# Patient Record
Sex: FEMALE | Race: WHITE | Employment: OTHER | URBAN - METROPOLITAN AREA
[De-identification: names, ages, dates, MRNs, and addresses within clinical notes are randomized per-mention and may not be internally consistent; named-entity substitution may affect disease eponyms.]

---

## 2017-01-18 DIAGNOSIS — Z78.9 STATIN INTOLERANCE: ICD-10-CM

## 2017-01-18 DIAGNOSIS — E78.00 PURE HYPERCHOLESTEROLEMIA: ICD-10-CM

## 2017-01-18 DIAGNOSIS — E55.9 VITAMIN D DEFICIENCY: ICD-10-CM

## 2017-01-18 DIAGNOSIS — E03.8 OTHER SPECIFIED HYPOTHYROIDISM: ICD-10-CM

## 2017-01-18 LAB
A/G RATIO: 1.8 (ref 1.1–2.2)
ALBUMIN SERPL-MCNC: 4.4 G/DL (ref 3.4–5)
ALP BLD-CCNC: 115 U/L (ref 40–129)
ALT SERPL-CCNC: 67 U/L (ref 10–40)
ANION GAP SERPL CALCULATED.3IONS-SCNC: 12 MMOL/L (ref 3–16)
AST SERPL-CCNC: 50 U/L (ref 15–37)
BILIRUB SERPL-MCNC: 0.5 MG/DL (ref 0–1)
BUN BLDV-MCNC: 14 MG/DL (ref 7–20)
CALCIUM SERPL-MCNC: 9.6 MG/DL (ref 8.3–10.6)
CHLORIDE BLD-SCNC: 100 MMOL/L (ref 99–110)
CHOLESTEROL, TOTAL: 276 MG/DL (ref 0–199)
CO2: 29 MMOL/L (ref 21–32)
CREAT SERPL-MCNC: 0.5 MG/DL (ref 0.6–1.2)
GFR AFRICAN AMERICAN: >60
GFR NON-AFRICAN AMERICAN: >60
GLOBULIN: 2.5 G/DL
GLUCOSE BLD-MCNC: 101 MG/DL (ref 70–99)
HDLC SERPL-MCNC: 101 MG/DL (ref 40–60)
LDL CHOLESTEROL CALCULATED: 154 MG/DL
POTASSIUM SERPL-SCNC: 4.4 MMOL/L (ref 3.5–5.1)
SODIUM BLD-SCNC: 141 MMOL/L (ref 136–145)
TOTAL PROTEIN: 6.9 G/DL (ref 6.4–8.2)
TRIGL SERPL-MCNC: 107 MG/DL (ref 0–150)
TSH SERPL DL<=0.05 MIU/L-ACNC: 4.3 UIU/ML (ref 0.27–4.2)
VITAMIN D 25-HYDROXY: 55.4 NG/ML
VLDLC SERPL CALC-MCNC: 21 MG/DL

## 2017-01-24 ENCOUNTER — OFFICE VISIT (OUTPATIENT)
Dept: ENDOCRINOLOGY | Age: 72
End: 2017-01-24

## 2017-01-24 VITALS
WEIGHT: 147.8 LBS | HEIGHT: 62 IN | RESPIRATION RATE: 14 BRPM | SYSTOLIC BLOOD PRESSURE: 110 MMHG | HEART RATE: 78 BPM | OXYGEN SATURATION: 96 % | BODY MASS INDEX: 27.2 KG/M2 | DIASTOLIC BLOOD PRESSURE: 68 MMHG

## 2017-01-24 DIAGNOSIS — E55.9 VITAMIN D DEFICIENCY: Primary | ICD-10-CM

## 2017-01-24 DIAGNOSIS — E78.00 PURE HYPERCHOLESTEROLEMIA: ICD-10-CM

## 2017-01-24 DIAGNOSIS — E03.9 ACQUIRED HYPOTHYROIDISM: ICD-10-CM

## 2017-01-24 DIAGNOSIS — Z78.9 STATIN INTOLERANCE: ICD-10-CM

## 2017-01-24 PROCEDURE — 99214 OFFICE O/P EST MOD 30 MIN: CPT | Performed by: INTERNAL MEDICINE

## 2017-01-24 RX ORDER — LEVOTHYROXINE SODIUM 0.05 MG/1
50 TABLET ORAL DAILY
Qty: 30 TABLET | Refills: 3 | Status: SHIPPED | OUTPATIENT
Start: 2017-01-24 | End: 2017-05-17 | Stop reason: SDUPTHER

## 2017-03-29 RX ORDER — ERGOCALCIFEROL 1.25 MG/1
CAPSULE ORAL
Qty: 8 CAPSULE | Refills: 4 | Status: SHIPPED | OUTPATIENT
Start: 2017-03-29 | End: 2017-05-30 | Stop reason: SDUPTHER

## 2017-04-17 RX ORDER — AMLODIPINE BESYLATE 5 MG/1
TABLET ORAL
Qty: 90 TABLET | Refills: 0 | Status: SHIPPED | OUTPATIENT
Start: 2017-04-17 | End: 2017-05-17 | Stop reason: SDUPTHER

## 2017-05-18 RX ORDER — LEVOTHYROXINE SODIUM 0.05 MG/1
TABLET ORAL
Qty: 30 TABLET | Refills: 3 | Status: SHIPPED | OUTPATIENT
Start: 2017-05-18 | End: 2017-09-18 | Stop reason: SDUPTHER

## 2017-05-25 DIAGNOSIS — E78.00 PURE HYPERCHOLESTEROLEMIA: ICD-10-CM

## 2017-05-25 DIAGNOSIS — E03.9 ACQUIRED HYPOTHYROIDISM: ICD-10-CM

## 2017-05-25 LAB
A/G RATIO: 1.5 (ref 1.1–2.2)
ALBUMIN SERPL-MCNC: 4.2 G/DL (ref 3.4–5)
ALP BLD-CCNC: 84 U/L (ref 40–129)
ALT SERPL-CCNC: 17 U/L (ref 10–40)
ANION GAP SERPL CALCULATED.3IONS-SCNC: 12 MMOL/L (ref 3–16)
AST SERPL-CCNC: 21 U/L (ref 15–37)
BILIRUB SERPL-MCNC: 0.5 MG/DL (ref 0–1)
BUN BLDV-MCNC: 11 MG/DL (ref 7–20)
CALCIUM SERPL-MCNC: 9.4 MG/DL (ref 8.3–10.6)
CHLORIDE BLD-SCNC: 101 MMOL/L (ref 99–110)
CHOLESTEROL, TOTAL: 210 MG/DL (ref 0–199)
CO2: 29 MMOL/L (ref 21–32)
CREAT SERPL-MCNC: <0.5 MG/DL (ref 0.6–1.2)
GFR AFRICAN AMERICAN: >60
GFR NON-AFRICAN AMERICAN: >60
GLOBULIN: 2.8 G/DL
GLUCOSE BLD-MCNC: 104 MG/DL (ref 70–99)
HDLC SERPL-MCNC: 108 MG/DL (ref 40–60)
LDL CHOLESTEROL CALCULATED: 90 MG/DL
POTASSIUM SERPL-SCNC: 4.2 MMOL/L (ref 3.5–5.1)
SODIUM BLD-SCNC: 142 MMOL/L (ref 136–145)
TOTAL PROTEIN: 7 G/DL (ref 6.4–8.2)
TRIGL SERPL-MCNC: 61 MG/DL (ref 0–150)
TSH SERPL DL<=0.05 MIU/L-ACNC: 4.44 UIU/ML (ref 0.27–4.2)
VLDLC SERPL CALC-MCNC: 12 MG/DL

## 2017-05-30 ENCOUNTER — OFFICE VISIT (OUTPATIENT)
Dept: ENDOCRINOLOGY | Age: 72
End: 2017-05-30

## 2017-05-30 VITALS
SYSTOLIC BLOOD PRESSURE: 137 MMHG | RESPIRATION RATE: 14 BRPM | DIASTOLIC BLOOD PRESSURE: 75 MMHG | OXYGEN SATURATION: 97 % | HEART RATE: 76 BPM | HEIGHT: 62 IN | BODY MASS INDEX: 27.75 KG/M2 | WEIGHT: 150.8 LBS

## 2017-05-30 DIAGNOSIS — E03.9 ACQUIRED HYPOTHYROIDISM: ICD-10-CM

## 2017-05-30 DIAGNOSIS — E78.00 PURE HYPERCHOLESTEROLEMIA: ICD-10-CM

## 2017-05-30 DIAGNOSIS — E55.9 VITAMIN D DEFICIENCY: Primary | ICD-10-CM

## 2017-05-30 PROCEDURE — 99213 OFFICE O/P EST LOW 20 MIN: CPT | Performed by: INTERNAL MEDICINE

## 2017-05-30 RX ORDER — ERGOCALCIFEROL 1.25 MG/1
CAPSULE ORAL
Qty: 24 CAPSULE | Refills: 4 | Status: SHIPPED | OUTPATIENT
Start: 2017-05-30 | End: 2018-05-31 | Stop reason: SDUPTHER

## 2017-06-15 RX ORDER — OMEPRAZOLE 20 MG/1
CAPSULE, DELAYED RELEASE ORAL
Qty: 30 CAPSULE | Refills: 4 | Status: SHIPPED | OUTPATIENT
Start: 2017-06-15 | End: 2017-11-15 | Stop reason: SDUPTHER

## 2017-08-18 RX ORDER — AMLODIPINE BESYLATE 5 MG/1
TABLET ORAL
Qty: 90 TABLET | Refills: 1 | Status: SHIPPED | OUTPATIENT
Start: 2017-08-18 | End: 2018-05-08 | Stop reason: SDUPTHER

## 2017-08-22 ENCOUNTER — TELEPHONE (OUTPATIENT)
Dept: INTERNAL MEDICINE CLINIC | Age: 72
End: 2017-08-22

## 2017-08-22 ENCOUNTER — OFFICE VISIT (OUTPATIENT)
Dept: INTERNAL MEDICINE CLINIC | Age: 72
End: 2017-08-22

## 2017-08-22 VITALS
HEIGHT: 62 IN | HEART RATE: 73 BPM | DIASTOLIC BLOOD PRESSURE: 84 MMHG | SYSTOLIC BLOOD PRESSURE: 150 MMHG | OXYGEN SATURATION: 97 % | WEIGHT: 147.8 LBS | BODY MASS INDEX: 27.2 KG/M2

## 2017-08-22 DIAGNOSIS — K21.9 GASTROESOPHAGEAL REFLUX DISEASE WITHOUT ESOPHAGITIS: ICD-10-CM

## 2017-08-22 DIAGNOSIS — I10 ESSENTIAL HYPERTENSION: Primary | ICD-10-CM

## 2017-08-22 DIAGNOSIS — E78.00 PURE HYPERCHOLESTEROLEMIA: ICD-10-CM

## 2017-08-22 LAB
BASOPHILS ABSOLUTE: 0 K/UL (ref 0–0.2)
BASOPHILS RELATIVE PERCENT: 0.7 %
EOSINOPHILS ABSOLUTE: 0.1 K/UL (ref 0–0.6)
EOSINOPHILS RELATIVE PERCENT: 1.5 %
HCT VFR BLD CALC: 41.8 % (ref 36–48)
HEMOGLOBIN: 13.8 G/DL (ref 12–16)
LYMPHOCYTES ABSOLUTE: 1.5 K/UL (ref 1–5.1)
LYMPHOCYTES RELATIVE PERCENT: 24.1 %
MCH RBC QN AUTO: 31.2 PG (ref 26–34)
MCHC RBC AUTO-ENTMCNC: 33.1 G/DL (ref 31–36)
MCV RBC AUTO: 94.1 FL (ref 80–100)
MONOCYTES ABSOLUTE: 0.6 K/UL (ref 0–1.3)
MONOCYTES RELATIVE PERCENT: 9.2 %
NEUTROPHILS ABSOLUTE: 4 K/UL (ref 1.7–7.7)
NEUTROPHILS RELATIVE PERCENT: 64.5 %
PDW BLD-RTO: 13 % (ref 12.4–15.4)
PLATELET # BLD: 290 K/UL (ref 135–450)
PMV BLD AUTO: 8 FL (ref 5–10.5)
RBC # BLD: 4.44 M/UL (ref 4–5.2)
WBC # BLD: 6.2 K/UL (ref 4–11)

## 2017-08-22 PROCEDURE — 99213 OFFICE O/P EST LOW 20 MIN: CPT | Performed by: INTERNAL MEDICINE

## 2017-08-22 ASSESSMENT — ENCOUNTER SYMPTOMS
RESPIRATORY NEGATIVE: 1
EYES NEGATIVE: 1

## 2017-09-18 RX ORDER — LEVOTHYROXINE SODIUM 0.05 MG/1
TABLET ORAL
Qty: 30 TABLET | Refills: 2 | Status: SHIPPED | OUTPATIENT
Start: 2017-09-18 | End: 2017-12-21 | Stop reason: SDUPTHER

## 2017-10-17 ENCOUNTER — NURSE ONLY (OUTPATIENT)
Dept: INTERNAL MEDICINE CLINIC | Age: 72
End: 2017-10-17

## 2017-10-17 DIAGNOSIS — Z23 NEED FOR INFLUENZA VACCINATION: Primary | ICD-10-CM

## 2017-10-17 PROCEDURE — G0008 ADMIN INFLUENZA VIRUS VAC: HCPCS | Performed by: INTERNAL MEDICINE

## 2017-10-17 PROCEDURE — 90662 IIV NO PRSV INCREASED AG IM: CPT | Performed by: INTERNAL MEDICINE

## 2017-11-27 DIAGNOSIS — E55.9 VITAMIN D DEFICIENCY: ICD-10-CM

## 2017-11-27 DIAGNOSIS — E78.00 PURE HYPERCHOLESTEROLEMIA: ICD-10-CM

## 2017-11-27 DIAGNOSIS — E03.9 ACQUIRED HYPOTHYROIDISM: ICD-10-CM

## 2017-11-27 LAB
A/G RATIO: 1.7 (ref 1.1–2.2)
ALBUMIN SERPL-MCNC: 4.4 G/DL (ref 3.4–5)
ALP BLD-CCNC: 80 U/L (ref 40–129)
ALT SERPL-CCNC: 16 U/L (ref 10–40)
ANION GAP SERPL CALCULATED.3IONS-SCNC: 17 MMOL/L (ref 3–16)
AST SERPL-CCNC: 20 U/L (ref 15–37)
BILIRUB SERPL-MCNC: 0.4 MG/DL (ref 0–1)
BUN BLDV-MCNC: 12 MG/DL (ref 7–20)
CALCIUM SERPL-MCNC: 9.4 MG/DL (ref 8.3–10.6)
CHLORIDE BLD-SCNC: 101 MMOL/L (ref 99–110)
CHOLESTEROL, TOTAL: 249 MG/DL (ref 0–199)
CO2: 27 MMOL/L (ref 21–32)
CREAT SERPL-MCNC: 0.5 MG/DL (ref 0.6–1.2)
GFR AFRICAN AMERICAN: >60
GFR NON-AFRICAN AMERICAN: >60
GLOBULIN: 2.6 G/DL
GLUCOSE BLD-MCNC: 100 MG/DL (ref 70–99)
HDLC SERPL-MCNC: 114 MG/DL (ref 40–60)
LDL CHOLESTEROL CALCULATED: 117 MG/DL
POTASSIUM SERPL-SCNC: 4.1 MMOL/L (ref 3.5–5.1)
SODIUM BLD-SCNC: 145 MMOL/L (ref 136–145)
TOTAL PROTEIN: 7 G/DL (ref 6.4–8.2)
TRIGL SERPL-MCNC: 88 MG/DL (ref 0–150)
TSH SERPL DL<=0.05 MIU/L-ACNC: 3.32 UIU/ML (ref 0.27–4.2)
VITAMIN D 25-HYDROXY: 59 NG/ML
VLDLC SERPL CALC-MCNC: 18 MG/DL

## 2017-11-30 ENCOUNTER — OFFICE VISIT (OUTPATIENT)
Dept: ENDOCRINOLOGY | Age: 72
End: 2017-11-30

## 2017-11-30 VITALS
DIASTOLIC BLOOD PRESSURE: 80 MMHG | RESPIRATION RATE: 14 BRPM | BODY MASS INDEX: 27.46 KG/M2 | WEIGHT: 149.2 LBS | SYSTOLIC BLOOD PRESSURE: 135 MMHG | HEART RATE: 73 BPM | OXYGEN SATURATION: 98 % | HEIGHT: 62 IN

## 2017-11-30 DIAGNOSIS — E55.9 VITAMIN D DEFICIENCY: Primary | ICD-10-CM

## 2017-11-30 DIAGNOSIS — E03.9 ACQUIRED HYPOTHYROIDISM: ICD-10-CM

## 2017-11-30 DIAGNOSIS — E78.00 PURE HYPERCHOLESTEROLEMIA: ICD-10-CM

## 2017-11-30 PROCEDURE — 99213 OFFICE O/P EST LOW 20 MIN: CPT | Performed by: INTERNAL MEDICINE

## 2017-11-30 NOTE — PROGRESS NOTES
Onset    Stroke Father      History   Smoking Status    Former Smoker    Quit date: 11/21/2001   Smokeless Tobacco    Never Used      History   Alcohol Use    Yes     Comment: socially       HPI    This 71 yrs old female is here for management of hyperlipidemia, vit D def, hypothyroidism. PCP Paula Lombardi MD     She has a PMH of hyperlipidemia, HTN , hypothyroidism, Vit  Def, diverticulitis    She took lipitor 40mg then started experiencing myalgias in upper legs, especially at night. Then took Crestor 10mg which reproduced the symptoms. She was started on vit D 50,000 units twice a week and was given crestor 20 mg daily with it. She tolerated it initially but then started having muscle pains and stopped it. She is currently on Livalo 2 mg every other day. Review of Systems   Constitutional: Positive for malaise/fatigue. Negative for chills, diaphoresis, fever and weight loss. Eyes: Negative for blurred vision, double vision and photophobia. Respiratory: Negative for cough and hemoptysis. Cardiovascular: Negative for chest pain, palpitations and orthopnea. Genitourinary: Negative for dysuria, flank pain, frequency, hematuria and urgency. Musculoskeletal: Negative for back pain, falls, joint pain, myalgias and neck pain. Skin: Negative for itching and rash. Neurological: Negative for dizziness, tingling, tremors, sensory change, speech change, focal weakness, seizures, loss of consciousness and headaches. Endo/Heme/Allergies: Negative for environmental allergies and polydipsia. Does not bruise/bleed easily. Psychiatric/Behavioral: Negative for depression, hallucinations, memory loss, substance abuse and suicidal ideas. The patient is not nervous/anxious and does not have insomnia. Physical Exam   Constitutional: She is oriented to person, place, and time. She appears well-developed. No distress.      Psychiatric: Her behavior is normal. Thought content normal. Orders Only on 11/27/2017   Component Date Value Ref Range Status    TSH 11/27/2017 3.32  0.27 - 4.20 uIU/mL Final    Vit D, 25-Hydroxy 11/27/2017 59.0  >=30 ng/mL Final    Comment: <=20 ng/mL. ........... Mariella Prows Deficient  21-29 ng/mL. ......... Mariella Prows Insufficient  >=30 ng/mL. ........ Mariella Prows Sufficient      Cholesterol, Total 11/27/2017 249* 0 - 199 mg/dL Final    Triglycerides 11/27/2017 88  0 - 150 mg/dL Final    HDL 11/27/2017 114* 40 - 60 mg/dL Final    LDL Calculated 11/27/2017 117* <100 mg/dL Final    VLDL CHOLESTEROL CALCULATED 11/27/2017 18  Not Established mg/dL Final    Sodium 11/27/2017 145  136 - 145 mmol/L Final    Potassium 11/27/2017 4.1  3.5 - 5.1 mmol/L Final    Chloride 11/27/2017 101  99 - 110 mmol/L Final    CO2 11/27/2017 27  21 - 32 mmol/L Final    Anion Gap 11/27/2017 17* 3 - 16 Final    Glucose 11/27/2017 100* 70 - 99 mg/dL Final    BUN 11/27/2017 12  7 - 20 mg/dL Final    CREATININE 11/27/2017 0.5* 0.6 - 1.2 mg/dL Final    GFR Non- 11/27/2017 >60  >60 Final    Comment: >60 mL/min/1.73m2 EGFR, calc. for ages 25 and older using the  MDRD formula (not corrected for weight), is valid for stable  renal function.  GFR  11/27/2017 >60  >60 Final    Comment: Chronic Kidney Disease: less than 60 ml/min/1.73 sq.m. Kidney Failure: less than 15 ml/min/1.73 sq.m. Results valid for patients 18 years and older.       Calcium 11/27/2017 9.4  8.3 - 10.6 mg/dL Final    Total Protein 11/27/2017 7.0  6.4 - 8.2 g/dL Final    Alb 11/27/2017 4.4  3.4 - 5.0 g/dL Final    Albumin/Globulin Ratio 11/27/2017 1.7  1.1 - 2.2 Final    Total Bilirubin 11/27/2017 0.4  0.0 - 1.0 mg/dL Final    Alkaline Phosphatase 11/27/2017 80  40 - 129 U/L Final    ALT 11/27/2017 16  10 - 40 U/L Final    AST 11/27/2017 20  15 - 37 U/L Final    Globulin 11/27/2017 2.6  g/dL Final   Office Visit on 08/22/2017   Component Date Value Ref Range Status    WBC 08/22/2017 6.2  4.0 - 11.0

## 2017-12-21 RX ORDER — LEVOTHYROXINE SODIUM 0.05 MG/1
TABLET ORAL
Qty: 30 TABLET | Refills: 5 | Status: SHIPPED | OUTPATIENT
Start: 2017-12-21 | End: 2018-05-31 | Stop reason: SDUPTHER

## 2018-01-10 ENCOUNTER — TELEPHONE (OUTPATIENT)
Dept: ENDOCRINOLOGY | Age: 73
End: 2018-01-10

## 2018-01-15 ENCOUNTER — TELEPHONE (OUTPATIENT)
Dept: ENDOCRINOLOGY | Age: 73
End: 2018-01-15

## 2018-01-17 NOTE — TELEPHONE ENCOUNTER
I faxed an appeal letter on 1/12/18 awaiting a response. I will update Mrs. Peters once we hear back from insurance.

## 2018-01-18 ENCOUNTER — TELEPHONE (OUTPATIENT)
Dept: ENDOCRINOLOGY | Age: 73
End: 2018-01-18

## 2018-01-18 NOTE — TELEPHONE ENCOUNTER
PA was done today with Imelda by phone. It has to go to review and [employer] will fax response.

## 2018-01-22 ENCOUNTER — TELEPHONE (OUTPATIENT)
Dept: ENDOCRINOLOGY | Age: 73
End: 2018-01-22

## 2018-02-09 ENCOUNTER — TELEPHONE (OUTPATIENT)
Dept: ENDOCRINOLOGY | Age: 73
End: 2018-02-09

## 2018-02-09 RX ORDER — PRAVASTATIN SODIUM 20 MG
20 TABLET ORAL EVERY EVENING
Qty: 30 TABLET | Refills: 3 | Status: SHIPPED | OUTPATIENT
Start: 2018-02-09 | End: 2018-05-31 | Stop reason: SINTOL

## 2018-03-12 ENCOUNTER — TELEPHONE (OUTPATIENT)
Dept: INTERNAL MEDICINE CLINIC | Age: 73
End: 2018-03-12

## 2018-03-15 ENCOUNTER — OFFICE VISIT (OUTPATIENT)
Dept: INTERNAL MEDICINE CLINIC | Age: 73
End: 2018-03-15

## 2018-03-15 VITALS
BODY MASS INDEX: 27.38 KG/M2 | HEIGHT: 62 IN | SYSTOLIC BLOOD PRESSURE: 156 MMHG | WEIGHT: 148.8 LBS | OXYGEN SATURATION: 96 % | DIASTOLIC BLOOD PRESSURE: 72 MMHG | HEART RATE: 54 BPM

## 2018-03-15 DIAGNOSIS — Z78.9 STATIN INTOLERANCE: ICD-10-CM

## 2018-03-15 DIAGNOSIS — E78.00 PURE HYPERCHOLESTEROLEMIA: ICD-10-CM

## 2018-03-15 DIAGNOSIS — I10 ESSENTIAL HYPERTENSION: Primary | ICD-10-CM

## 2018-03-15 PROCEDURE — 99213 OFFICE O/P EST LOW 20 MIN: CPT | Performed by: INTERNAL MEDICINE

## 2018-03-15 PROCEDURE — 3288F FALL RISK ASSESSMENT DOCD: CPT | Performed by: INTERNAL MEDICINE

## 2018-03-15 RX ORDER — TRIAMTERENE AND HYDROCHLOROTHIAZIDE 37.5; 25 MG/1; MG/1
1 TABLET ORAL DAILY
Qty: 30 TABLET | Refills: 3 | Status: SHIPPED | OUTPATIENT
Start: 2018-03-15 | End: 2018-07-28 | Stop reason: SDUPTHER

## 2018-03-15 RX ORDER — OMEPRAZOLE 20 MG/1
CAPSULE, DELAYED RELEASE ORAL
Qty: 30 CAPSULE | Refills: 5 | Status: SHIPPED | OUTPATIENT
Start: 2018-03-15 | End: 2018-10-11 | Stop reason: SDUPTHER

## 2018-03-15 ASSESSMENT — ENCOUNTER SYMPTOMS
RESPIRATORY NEGATIVE: 1
EYES NEGATIVE: 1

## 2018-03-29 ENCOUNTER — TELEPHONE (OUTPATIENT)
Dept: INTERNAL MEDICINE CLINIC | Age: 73
End: 2018-03-29

## 2018-03-29 DIAGNOSIS — I10 ESSENTIAL HYPERTENSION: ICD-10-CM

## 2018-03-29 LAB
ANION GAP SERPL CALCULATED.3IONS-SCNC: 15 MMOL/L (ref 3–16)
BUN BLDV-MCNC: 15 MG/DL (ref 7–20)
CALCIUM SERPL-MCNC: 9.6 MG/DL (ref 8.3–10.6)
CHLORIDE BLD-SCNC: 96 MMOL/L (ref 99–110)
CO2: 29 MMOL/L (ref 21–32)
CREAT SERPL-MCNC: 0.6 MG/DL (ref 0.6–1.2)
GFR AFRICAN AMERICAN: >60
GFR NON-AFRICAN AMERICAN: >60
GLUCOSE BLD-MCNC: 105 MG/DL (ref 70–99)
POTASSIUM SERPL-SCNC: 4.4 MMOL/L (ref 3.5–5.1)
SODIUM BLD-SCNC: 140 MMOL/L (ref 136–145)

## 2018-05-01 ENCOUNTER — OFFICE VISIT (OUTPATIENT)
Dept: INTERNAL MEDICINE CLINIC | Age: 73
End: 2018-05-01

## 2018-05-01 VITALS
WEIGHT: 148 LBS | OXYGEN SATURATION: 98 % | SYSTOLIC BLOOD PRESSURE: 152 MMHG | HEIGHT: 62 IN | DIASTOLIC BLOOD PRESSURE: 86 MMHG | HEART RATE: 87 BPM | BODY MASS INDEX: 27.23 KG/M2

## 2018-05-01 DIAGNOSIS — I10 ESSENTIAL HYPERTENSION: ICD-10-CM

## 2018-05-01 DIAGNOSIS — E78.00 PURE HYPERCHOLESTEROLEMIA: ICD-10-CM

## 2018-05-01 DIAGNOSIS — R22.1 NECK MASS: Primary | ICD-10-CM

## 2018-05-01 LAB
BASOPHILS ABSOLUTE: 0.1 K/UL (ref 0–0.2)
BASOPHILS RELATIVE PERCENT: 0.7 %
EOSINOPHILS ABSOLUTE: 0.1 K/UL (ref 0–0.6)
EOSINOPHILS RELATIVE PERCENT: 1 %
HCT VFR BLD CALC: 42.6 % (ref 36–48)
HEMOGLOBIN: 14.3 G/DL (ref 12–16)
LYMPHOCYTES ABSOLUTE: 2.1 K/UL (ref 1–5.1)
LYMPHOCYTES RELATIVE PERCENT: 25.1 %
MCH RBC QN AUTO: 31.4 PG (ref 26–34)
MCHC RBC AUTO-ENTMCNC: 33.5 G/DL (ref 31–36)
MCV RBC AUTO: 93.7 FL (ref 80–100)
MONO TEST: NEGATIVE
MONOCYTES ABSOLUTE: 0.6 K/UL (ref 0–1.3)
MONOCYTES RELATIVE PERCENT: 7.4 %
NEUTROPHILS ABSOLUTE: 5.4 K/UL (ref 1.7–7.7)
NEUTROPHILS RELATIVE PERCENT: 65.8 %
PDW BLD-RTO: 12.6 % (ref 12.4–15.4)
PLATELET # BLD: 315 K/UL (ref 135–450)
PMV BLD AUTO: 8 FL (ref 5–10.5)
RBC # BLD: 4.55 M/UL (ref 4–5.2)
SEDIMENTATION RATE, ERYTHROCYTE: 21 MM/HR (ref 0–30)
WBC # BLD: 8.2 K/UL (ref 4–11)

## 2018-05-01 PROCEDURE — 99213 OFFICE O/P EST LOW 20 MIN: CPT | Performed by: INTERNAL MEDICINE

## 2018-05-01 PROCEDURE — G8510 SCR DEP NEG, NO PLAN REQD: HCPCS | Performed by: INTERNAL MEDICINE

## 2018-05-01 ASSESSMENT — PATIENT HEALTH QUESTIONNAIRE - PHQ9
SUM OF ALL RESPONSES TO PHQ QUESTIONS 1-9: 0
2. FEELING DOWN, DEPRESSED OR HOPELESS: 0
1. LITTLE INTEREST OR PLEASURE IN DOING THINGS: 0
SUM OF ALL RESPONSES TO PHQ9 QUESTIONS 1 & 2: 0

## 2018-05-01 ASSESSMENT — ENCOUNTER SYMPTOMS
EYES NEGATIVE: 1
RESPIRATORY NEGATIVE: 1

## 2018-05-09 RX ORDER — AMLODIPINE BESYLATE 5 MG/1
TABLET ORAL
Qty: 90 TABLET | Refills: 3 | Status: SHIPPED | OUTPATIENT
Start: 2018-05-09 | End: 2018-05-09 | Stop reason: SDUPTHER

## 2018-05-09 RX ORDER — AMLODIPINE BESYLATE 5 MG/1
TABLET ORAL
Qty: 90 TABLET | Refills: 2 | Status: SHIPPED | OUTPATIENT
Start: 2018-05-09 | End: 2018-11-27 | Stop reason: SDUPTHER

## 2018-05-23 DIAGNOSIS — E78.00 PURE HYPERCHOLESTEROLEMIA: ICD-10-CM

## 2018-05-23 DIAGNOSIS — E03.9 ACQUIRED HYPOTHYROIDISM: ICD-10-CM

## 2018-05-23 DIAGNOSIS — E55.9 VITAMIN D DEFICIENCY: ICD-10-CM

## 2018-05-23 LAB
A/G RATIO: 1.8 (ref 1.1–2.2)
ALBUMIN SERPL-MCNC: 4.3 G/DL (ref 3.4–5)
ALP BLD-CCNC: 68 U/L (ref 40–129)
ALT SERPL-CCNC: 15 U/L (ref 10–40)
ANION GAP SERPL CALCULATED.3IONS-SCNC: 15 MMOL/L (ref 3–16)
AST SERPL-CCNC: 19 U/L (ref 15–37)
BILIRUB SERPL-MCNC: 0.5 MG/DL (ref 0–1)
BUN BLDV-MCNC: 15 MG/DL (ref 7–20)
CALCIUM SERPL-MCNC: 9.4 MG/DL (ref 8.3–10.6)
CHLORIDE BLD-SCNC: 99 MMOL/L (ref 99–110)
CHOLESTEROL, TOTAL: 227 MG/DL (ref 0–199)
CO2: 27 MMOL/L (ref 21–32)
CREAT SERPL-MCNC: 0.6 MG/DL (ref 0.6–1.2)
GFR AFRICAN AMERICAN: >60
GFR NON-AFRICAN AMERICAN: >60
GLOBULIN: 2.4 G/DL
GLUCOSE BLD-MCNC: 103 MG/DL (ref 70–99)
HDLC SERPL-MCNC: 95 MG/DL (ref 40–60)
LDL CHOLESTEROL CALCULATED: 115 MG/DL
POTASSIUM SERPL-SCNC: 4.1 MMOL/L (ref 3.5–5.1)
SODIUM BLD-SCNC: 141 MMOL/L (ref 136–145)
TOTAL PROTEIN: 6.7 G/DL (ref 6.4–8.2)
TRIGL SERPL-MCNC: 87 MG/DL (ref 0–150)
TSH SERPL DL<=0.05 MIU/L-ACNC: 3.17 UIU/ML (ref 0.27–4.2)
VLDLC SERPL CALC-MCNC: 17 MG/DL

## 2018-05-29 ENCOUNTER — HOSPITAL ENCOUNTER (OUTPATIENT)
Dept: CT IMAGING | Age: 73
Discharge: HOME | End: 2018-05-29
Attending: OTOLARYNGOLOGY | Admitting: OTOLARYNGOLOGY

## 2018-05-29 DIAGNOSIS — R22.1 LOCALIZED SWELLING, MASS OR LUMP OF NECK: ICD-10-CM

## 2018-05-31 ENCOUNTER — OFFICE VISIT (OUTPATIENT)
Dept: ENDOCRINOLOGY | Age: 73
End: 2018-05-31

## 2018-05-31 VITALS
HEIGHT: 62 IN | RESPIRATION RATE: 14 BRPM | HEART RATE: 70 BPM | SYSTOLIC BLOOD PRESSURE: 127 MMHG | DIASTOLIC BLOOD PRESSURE: 71 MMHG | OXYGEN SATURATION: 99 % | WEIGHT: 146.6 LBS | BODY MASS INDEX: 26.98 KG/M2

## 2018-05-31 DIAGNOSIS — E78.00 PURE HYPERCHOLESTEROLEMIA: Primary | ICD-10-CM

## 2018-05-31 DIAGNOSIS — R73.01 IMPAIRED FASTING BLOOD SUGAR: ICD-10-CM

## 2018-05-31 DIAGNOSIS — E55.9 VITAMIN D DEFICIENCY: ICD-10-CM

## 2018-05-31 DIAGNOSIS — E03.9 ACQUIRED HYPOTHYROIDISM: ICD-10-CM

## 2018-05-31 PROCEDURE — 99214 OFFICE O/P EST MOD 30 MIN: CPT | Performed by: INTERNAL MEDICINE

## 2018-05-31 RX ORDER — LEVOTHYROXINE SODIUM 0.05 MG/1
TABLET ORAL
Qty: 90 TABLET | Refills: 2 | Status: SHIPPED | OUTPATIENT
Start: 2018-05-31 | End: 2019-02-25 | Stop reason: SDUPTHER

## 2018-05-31 RX ORDER — ERGOCALCIFEROL 1.25 MG/1
CAPSULE ORAL
Qty: 24 CAPSULE | Refills: 4 | Status: SHIPPED | OUTPATIENT
Start: 2018-05-31 | End: 2019-01-08 | Stop reason: SDUPTHER

## 2018-07-28 DIAGNOSIS — I10 ESSENTIAL HYPERTENSION: ICD-10-CM

## 2018-07-30 RX ORDER — TRIAMTERENE AND HYDROCHLOROTHIAZIDE 37.5; 25 MG/1; MG/1
TABLET ORAL
Qty: 30 TABLET | Refills: 5 | Status: SHIPPED | OUTPATIENT
Start: 2018-07-30 | End: 2019-01-23 | Stop reason: SDUPTHER

## 2018-08-28 ENCOUNTER — OFFICE VISIT (OUTPATIENT)
Dept: INTERNAL MEDICINE CLINIC | Age: 73
End: 2018-08-28

## 2018-08-28 VITALS
HEIGHT: 62 IN | WEIGHT: 143.2 LBS | RESPIRATION RATE: 12 BRPM | OXYGEN SATURATION: 96 % | DIASTOLIC BLOOD PRESSURE: 84 MMHG | SYSTOLIC BLOOD PRESSURE: 134 MMHG | BODY MASS INDEX: 26.35 KG/M2 | HEART RATE: 88 BPM

## 2018-08-28 DIAGNOSIS — I10 ESSENTIAL HYPERTENSION: ICD-10-CM

## 2018-08-28 DIAGNOSIS — G89.29 CHRONIC LEFT SHOULDER PAIN: ICD-10-CM

## 2018-08-28 DIAGNOSIS — E78.00 PURE HYPERCHOLESTEROLEMIA: Primary | ICD-10-CM

## 2018-08-28 DIAGNOSIS — K21.9 GASTROESOPHAGEAL REFLUX DISEASE WITHOUT ESOPHAGITIS: ICD-10-CM

## 2018-08-28 DIAGNOSIS — M25.512 CHRONIC LEFT SHOULDER PAIN: ICD-10-CM

## 2018-08-28 DIAGNOSIS — Z15.89 COMPOUND HETEROZYGOUS MTHFR MUTATION C677T/A1298C: ICD-10-CM

## 2018-08-28 DIAGNOSIS — E03.9 ACQUIRED HYPOTHYROIDISM: ICD-10-CM

## 2018-08-28 DIAGNOSIS — R22.1 MASS OF LATERAL NECK: ICD-10-CM

## 2018-08-28 PROCEDURE — 99214 OFFICE O/P EST MOD 30 MIN: CPT | Performed by: INTERNAL MEDICINE

## 2018-08-28 ASSESSMENT — ENCOUNTER SYMPTOMS
DIARRHEA: 0
ABDOMINAL PAIN: 0
BLURRED VISION: 0
WHEEZING: 0
NAUSEA: 0
BLOOD IN STOOL: 0
BACK PAIN: 0
SHORTNESS OF BREATH: 0
SORE THROAT: 0
VOMITING: 0
EYE PAIN: 0
COUGH: 0
HEARTBURN: 0
CONSTIPATION: 0

## 2018-08-28 NOTE — PROGRESS NOTES
OUTPATIENT PROGRESS NOTE  Date of Service:  8/28/2018  Address: [facility]  [address] [address]  Dept: [phone]    Subjective:      Patient ID: [id number]  Hernandez Zavaleta is a 68 y.o. female    Chief Complaint   Patient presents with    Hypertension     yearly check up              Neck mass  Present  But has been evaluuated   By ENT !! Taking livalo   Qod   Does not seem to bother her   HPI cannot tolerate several other statins. Here to review  Htn. Has  No related complaints and is compliant with  Meds. There is no dyspnea, chest pain or palpitations, edema, headache  Or dizzyness. Tolerating  medication and we review all meds and pertinant labs. C/o shoulder pain on left w/o injury  Dr Luis Medellin ordered Neck CT scan , however, unclear if he saw. We also reviewed her MRI of the pancreas from Dr. Richa Kim and the fact that she still has a mild right sided neck mass. She has no abdominal pain, anorexia, or other signs of intra-abdominal processes. Diagnosis Orders   1. Pure hypercholesterolemia     2. Essential hypertension     3. Gastroesophageal reflux disease without esophagitis     4. Acquired hypothyroidism         Vitals:  /84   Pulse 88   Resp 12   Ht 5' 2\" (1.575 m)   Wt 143 lb 3.2 oz (65 kg)   SpO2 96%   BMI 26.19 kg/m²     Current Outpatient Prescriptions   Medication Sig Dispense Refill    triamterene-hydrochlorothiazide (MAXZIDE-25) 37.5-25 MG per tablet TAKE ONE TABLET BY MOUTH EVERY DAY 30 tablet 5    pitavastatin (LIVALO) 2 MG TABS tablet Take 1 tablet every other day. 15 tablet 2    levothyroxine (SYNTHROID) 50 MCG tablet TAKE ONE TABLET BY MOUTH DAILY 90 tablet 2    vitamin D (ERGOCALCIFEROL) 44568 units CAPS capsule TAKE ONE CAPSULE BY MOUTHTWICE A WEEK.  24 capsule 4    amLODIPine (NORVASC) 5 MG tablet TAKE ONE TABLET BY MOUTH EVERY DAY 90 tablet 2    omeprazole (PRILOSEC) 20 MG delayed release Negative for depression and memory loss. The patient is not nervous/anxious. Objective:   Physical Exam   Constitutional: She is oriented to person, place, and time. She appears well-developed and well-nourished. HENT:   Head: Normocephalic and atraumatic. Small 1-2 cm soft, flat posterior right neck mass. She believes smaller than previously   Eyes: Pupils are equal, round, and reactive to light. Conjunctivae and EOM are normal. No scleral icterus. Neck: Normal range of motion. No thyromegaly present. Cardiovascular: Normal rate, regular rhythm and normal heart sounds. No murmur heard. Pulmonary/Chest: Effort normal. No respiratory distress. She has no wheezes. She has no rales. Abdominal: Soft. Musculoskeletal: She exhibits no edema. Neurological: She is alert and oriented to person, place, and time. She has normal reflexes. Psychiatric: She has a normal mood and affect. Her behavior is normal. Judgment and thought content normal.   Vitals reviewed. Assessment/Plan   1. Pure hypercholesterolemia  Stable . Continue Livalo every other day check labs. For this she sees endocrinology. 2. Essential hypertension  Well controlled, continue meds not an issue      3. Gastroesophageal reflux disease without esophagitis  See GI  Will also call Dr. Kalyn Benitez and confirm that he feels no further workup on the pancreas is necessary. A referral was printed out for her convenience in calling him. 4. Acquired hypothyroidism  See endo continue same     #5 . Shoulder pain, chronic referral to Dr. Soraya Paredes may need physical therapy. #6    .  Massive neck still present confusion as to whether ENT ever saw her CT scan. We'll send copy to Dr. Lorna Mcallister of ENT and patient will call his it is still present, though smaller to see if she should be seen again and evaluated for possible biopsy. The patient has acknowledged that she will call both Dr. Lorna Mcallister and Dr. Kalyn Benitez.   Davon Aquino MD

## 2018-10-11 DIAGNOSIS — Z78.9 STATIN INTOLERANCE: ICD-10-CM

## 2018-10-12 RX ORDER — OMEPRAZOLE 20 MG/1
CAPSULE, DELAYED RELEASE ORAL
Qty: 30 CAPSULE | Refills: 5 | Status: SHIPPED | OUTPATIENT
Start: 2018-10-12 | End: 2019-03-22 | Stop reason: SDUPTHER

## 2018-10-18 ENCOUNTER — HOSPITAL ENCOUNTER (OUTPATIENT)
Dept: MRI IMAGING | Age: 73
Discharge: HOME | End: 2018-10-18
Payer: MEDICARE

## 2018-10-18 DIAGNOSIS — K86.2 PANCREAS CYST: ICD-10-CM

## 2018-10-18 PROCEDURE — 74183 MRI ABD W/O CNTR FLWD CNTR: CPT

## 2018-10-18 PROCEDURE — 6360000004 HC RX CONTRAST MEDICATION: Performed by: INTERNAL MEDICINE

## 2018-10-18 PROCEDURE — A9577 INJ MULTIHANCE: HCPCS | Performed by: INTERNAL MEDICINE

## 2018-10-18 RX ADMIN — GADOBENATE DIMEGLUMINE 13 ML: 529 INJECTION, SOLUTION INTRAVENOUS at 10:43

## 2018-10-25 ENCOUNTER — HOSPITAL ENCOUNTER (OUTPATIENT)
Dept: ULTRASOUND IMAGING | Age: 73
Discharge: HOME | End: 2018-10-25
Payer: MEDICARE

## 2018-10-25 ENCOUNTER — HOSPITAL ENCOUNTER (OUTPATIENT)
Dept: WOMENS IMAGING | Age: 73
Discharge: HOME | End: 2018-10-25
Payer: MEDICARE

## 2018-10-25 DIAGNOSIS — R92.8 ABNORMAL MAMMOGRAM: ICD-10-CM

## 2018-10-25 PROCEDURE — 76642 ULTRASOUND BREAST LIMITED: CPT

## 2018-10-25 PROCEDURE — G0279 TOMOSYNTHESIS, MAMMO: HCPCS

## 2018-10-31 RX ORDER — PITAVASTATIN CALCIUM 2.09 MG/1
TABLET, FILM COATED ORAL
Qty: 15 TABLET | Refills: 3 | Status: SHIPPED | OUTPATIENT
Start: 2018-10-31 | End: 2019-01-08 | Stop reason: SDUPTHER

## 2018-11-27 RX ORDER — AMLODIPINE BESYLATE 5 MG/1
TABLET ORAL
Qty: 90 TABLET | Refills: 3 | Status: SHIPPED | OUTPATIENT
Start: 2018-11-27 | End: 2019-01-11 | Stop reason: SDUPTHER

## 2019-01-03 DIAGNOSIS — E55.9 VITAMIN D DEFICIENCY: ICD-10-CM

## 2019-01-03 DIAGNOSIS — E03.9 ACQUIRED HYPOTHYROIDISM: ICD-10-CM

## 2019-01-03 DIAGNOSIS — R73.01 IMPAIRED FASTING BLOOD SUGAR: ICD-10-CM

## 2019-01-03 DIAGNOSIS — E78.00 PURE HYPERCHOLESTEROLEMIA: ICD-10-CM

## 2019-01-03 LAB
A/G RATIO: 1.6 (ref 1.1–2.2)
ALBUMIN SERPL-MCNC: 4.4 G/DL (ref 3.4–5)
ALP BLD-CCNC: 82 U/L (ref 40–129)
ALT SERPL-CCNC: 19 U/L (ref 10–40)
ANION GAP SERPL CALCULATED.3IONS-SCNC: 11 MMOL/L (ref 3–16)
AST SERPL-CCNC: 22 U/L (ref 15–37)
BILIRUB SERPL-MCNC: 0.3 MG/DL (ref 0–1)
BUN BLDV-MCNC: 17 MG/DL (ref 7–20)
CALCIUM SERPL-MCNC: 9.9 MG/DL (ref 8.3–10.6)
CHLORIDE BLD-SCNC: 99 MMOL/L (ref 99–110)
CHOLESTEROL, TOTAL: 268 MG/DL (ref 0–199)
CO2: 30 MMOL/L (ref 21–32)
CREAT SERPL-MCNC: 0.8 MG/DL (ref 0.6–1.2)
GFR AFRICAN AMERICAN: >60
GFR NON-AFRICAN AMERICAN: >60
GLOBULIN: 2.8 G/DL
GLUCOSE BLD-MCNC: 103 MG/DL (ref 70–99)
HDLC SERPL-MCNC: 85 MG/DL (ref 40–60)
LDL CHOLESTEROL CALCULATED: 158 MG/DL
POTASSIUM SERPL-SCNC: 3.5 MMOL/L (ref 3.5–5.1)
SODIUM BLD-SCNC: 140 MMOL/L (ref 136–145)
TOTAL PROTEIN: 7.2 G/DL (ref 6.4–8.2)
TRIGL SERPL-MCNC: 124 MG/DL (ref 0–150)
TSH SERPL DL<=0.05 MIU/L-ACNC: 3.46 UIU/ML (ref 0.27–4.2)
VITAMIN D 25-HYDROXY: 85.1 NG/ML
VLDLC SERPL CALC-MCNC: 25 MG/DL

## 2019-01-08 ENCOUNTER — OFFICE VISIT (OUTPATIENT)
Dept: ENDOCRINOLOGY | Age: 74
End: 2019-01-08
Payer: MEDICARE

## 2019-01-08 VITALS
SYSTOLIC BLOOD PRESSURE: 125 MMHG | WEIGHT: 142.8 LBS | HEART RATE: 85 BPM | RESPIRATION RATE: 16 BRPM | DIASTOLIC BLOOD PRESSURE: 74 MMHG | BODY MASS INDEX: 26.28 KG/M2 | OXYGEN SATURATION: 96 % | HEIGHT: 62 IN

## 2019-01-08 DIAGNOSIS — E78.00 PURE HYPERCHOLESTEROLEMIA: Primary | ICD-10-CM

## 2019-01-08 DIAGNOSIS — E55.9 VITAMIN D DEFICIENCY: ICD-10-CM

## 2019-01-08 DIAGNOSIS — R73.01 IMPAIRED FASTING GLUCOSE: ICD-10-CM

## 2019-01-08 DIAGNOSIS — E03.9 ACQUIRED HYPOTHYROIDISM: ICD-10-CM

## 2019-01-08 PROCEDURE — 99214 OFFICE O/P EST MOD 30 MIN: CPT | Performed by: INTERNAL MEDICINE

## 2019-01-08 RX ORDER — ERGOCALCIFEROL 1.25 MG/1
CAPSULE ORAL
Qty: 12 CAPSULE | Refills: 4
Start: 2019-01-08 | End: 2019-07-30 | Stop reason: SDUPTHER

## 2019-01-14 ENCOUNTER — HOSPITAL ENCOUNTER (OUTPATIENT)
Dept: WOMENS IMAGING | Age: 74
Discharge: HOME | End: 2019-01-14
Payer: MEDICARE

## 2019-01-14 DIAGNOSIS — Z13.820 OSTEOPOROSIS SCREENING: ICD-10-CM

## 2019-01-14 PROCEDURE — 77080 DXA BONE DENSITY AXIAL: CPT

## 2019-01-14 RX ORDER — AMLODIPINE BESYLATE 5 MG/1
TABLET ORAL
Qty: 90 TABLET | Refills: 3 | Status: SHIPPED | OUTPATIENT
Start: 2019-01-14 | End: 2020-01-02

## 2019-01-18 ENCOUNTER — TELEPHONE (OUTPATIENT)
Dept: ENDOCRINOLOGY | Age: 74
End: 2019-01-18

## 2019-02-26 RX ORDER — LEVOTHYROXINE SODIUM 0.05 MG/1
TABLET ORAL
Qty: 90 TABLET | Refills: 2 | Status: SHIPPED | OUTPATIENT
Start: 2019-02-26 | End: 2019-09-14 | Stop reason: SDUPTHER

## 2019-03-21 DIAGNOSIS — E78.00 PURE HYPERCHOLESTEROLEMIA: Primary | ICD-10-CM

## 2019-03-22 RX ORDER — PITAVASTATIN CALCIUM 2.09 MG/1
TABLET, FILM COATED ORAL
Qty: 15 TABLET | Refills: 3 | Status: SHIPPED | OUTPATIENT
Start: 2019-03-22 | End: 2019-07-09 | Stop reason: SDUPTHER

## 2019-04-24 ENCOUNTER — OFFICE VISIT (OUTPATIENT)
Dept: ENT CLINIC | Age: 74
End: 2019-04-24
Payer: MEDICARE

## 2019-04-24 VITALS
HEIGHT: 62 IN | BODY MASS INDEX: 25.76 KG/M2 | WEIGHT: 140 LBS | SYSTOLIC BLOOD PRESSURE: 132 MMHG | TEMPERATURE: 97.5 F | HEART RATE: 79 BPM | DIASTOLIC BLOOD PRESSURE: 69 MMHG

## 2019-04-24 DIAGNOSIS — R22.1 LOCALIZED SWELLING, MASS OR LUMP OF NECK: Primary | ICD-10-CM

## 2019-04-24 DIAGNOSIS — H90.3 SENSORINEURAL HEARING LOSS, BILATERAL: ICD-10-CM

## 2019-04-24 PROCEDURE — 99204 OFFICE O/P NEW MOD 45 MIN: CPT | Performed by: OTOLARYNGOLOGY

## 2019-04-24 NOTE — PROGRESS NOTES
CHIEF COMPLAINT: Lump on her neck    HISTORY OF PRESENT ILLNESS:  69 y.o. female referred for consultation who presents with a lump in her neck which has been present for 1 year. Not tender. Not growing. No associated URI. Discovered incidentally by hairdresser. No \"B\" symptoms. No smoking in 18 years. Had a CT by another ENT. Also concerned re: hearing loss both ears. Tested 1 year ago and hearing aid recommended. PAST MEDICAL HISTORY:   Social History     Tobacco Use   Smoking Status Former Smoker    Last attempt to quit: 2001    Years since quittin.4   Smokeless Tobacco Never Used                                                    Social History     Substance and Sexual Activity   Alcohol Use Yes    Comment: socially                                                    Current Outpatient Medications:     triamterene-hydrochlorothiazide (MAXZIDE-25) 37.5-25 MG per tablet, TAKE ONE TABLET BY MOUTH EVERY DAY, Disp: 30 tablet, Rfl: 0    LIVALO 2 MG TABS tablet, TAKE 1 TABLET EVERY OTHER DAY., Disp: 15 tablet, Rfl: 3    omeprazole (PRILOSEC) 20 MG delayed release capsule, TAKE ONE CAPSULE BY MOUTH DAILY, Disp: 30 capsule, Rfl: 5    levothyroxine (SYNTHROID) 50 MCG tablet, TAKE ONE TABLET BY MOUTH DAILY, Disp: 90 tablet, Rfl: 2    amLODIPine (NORVASC) 5 MG tablet, TAKE ONE TABLET BY MOUTH EVERY DAY, Disp: 90 tablet, Rfl: 3    vitamin D (ERGOCALCIFEROL) 98880 units CAPS capsule, TAKE ONE CAPSULE BY MOUTH ONCE A WEEK., Disp: 12 capsule, Rfl: 4    Multiple Vitamins-Minerals (MULTI-VITAMIN GUMMIES PO), Take by mouth, Disp: , Rfl:     aspirin 81 MG tablet, Take 81 mg by mouth daily. , Disp: , Rfl:                                                  Past Medical History:   Diagnosis Date    Diverticulitis     Diverticulitis 3/1/16    Hyperlipidemia     Hypertension     Osteoarthritis     Pancreatic mass tail                                                    Past Surgical History:   Procedure Laterality Date    BLADDER SUSPENSION  2007    COLONOSCOPY  1/2012    polyp-ghastine    HYSTERECTOMY  2007         REVIEW OF SYSTEMS:  All pertinent positive and negative review of systems included in HPI. Otherwise, all systems are reviewed and negative. PHYSICAL EXAMINATION:   GENERAL: wdwn- no acute distress  COMMUNICATION :  Normal voice  MENTAL STATUS:  Normal  HEAD AND FACE:  Normal  EXTERNAL EARS AND NOSE:  Normal  FACIAL MUSCLES:  Normal  EXTRAOCULAR MUSCLES: Intact  FACE PALPATION:  Negative  OTOSCOPY:  Normal tympanic membranes and middle ear spaces  TUNING FORKS: Rinne ++ Tompkins midline at 512 Hz  INTRANASAL:  Septum midline, turbinates normal, meati clear. LIPS, TEETH, GINGIVA:  Normal mucosa  PHARYNX:  Normal  NECK:  No masses or adenopathy. I believe the \"mass\" is part of the occipital musculature. SALIVARY GLANDS:  Normal  THYROID:  Normal  CT REVIEWED:  No neck masses  AUDIOGRAM & TYMPANOGRAM ORDERED AND REVIEWED: Profound left hearing loss. Poor discrimination. IMPRESSION: Sensorineural hearing loss left. Patient has had a normal MRI. PLAN: Reassured normal neck exam.  Referred for CROS hearing aid. FOLLOW-UP: As needed.

## 2019-05-30 ENCOUNTER — HOSPITAL ENCOUNTER (OUTPATIENT)
Dept: WOMENS IMAGING | Age: 74
Discharge: HOME | End: 2019-05-30
Payer: MEDICARE

## 2019-05-30 DIAGNOSIS — R92.8 ABNORMAL MAMMOGRAM: ICD-10-CM

## 2019-05-30 PROCEDURE — G0279 TOMOSYNTHESIS, MAMMO: HCPCS

## 2019-06-18 PROBLEM — Z17.1: Status: ACTIVE | Noted: 2019-06-18

## 2019-06-18 PROBLEM — C50.619: Status: ACTIVE | Noted: 2019-06-18

## 2019-07-02 DIAGNOSIS — E55.9 VITAMIN D DEFICIENCY: ICD-10-CM

## 2019-07-02 DIAGNOSIS — E78.00 PURE HYPERCHOLESTEROLEMIA: ICD-10-CM

## 2019-07-02 DIAGNOSIS — E03.9 ACQUIRED HYPOTHYROIDISM: ICD-10-CM

## 2019-07-02 LAB
A/G RATIO: 1.8 (ref 1.1–2.2)
ALBUMIN SERPL-MCNC: 4.8 G/DL (ref 3.4–5)
ALP BLD-CCNC: 87 U/L (ref 40–129)
ALT SERPL-CCNC: 16 U/L (ref 10–40)
ANION GAP SERPL CALCULATED.3IONS-SCNC: 13 MMOL/L (ref 3–16)
AST SERPL-CCNC: 21 U/L (ref 15–37)
BILIRUB SERPL-MCNC: 0.6 MG/DL (ref 0–1)
BUN BLDV-MCNC: 16 MG/DL (ref 7–20)
CALCIUM SERPL-MCNC: 10.4 MG/DL (ref 8.3–10.6)
CHLORIDE BLD-SCNC: 100 MMOL/L (ref 99–110)
CHOLESTEROL, TOTAL: 236 MG/DL (ref 0–199)
CO2: 28 MMOL/L (ref 21–32)
CREAT SERPL-MCNC: 0.8 MG/DL (ref 0.6–1.2)
GFR AFRICAN AMERICAN: >60
GFR NON-AFRICAN AMERICAN: >60
GLOBULIN: 2.7 G/DL
GLUCOSE BLD-MCNC: 101 MG/DL (ref 70–99)
HDLC SERPL-MCNC: 102 MG/DL (ref 40–60)
LDL CHOLESTEROL CALCULATED: 114 MG/DL
POTASSIUM SERPL-SCNC: 3.5 MMOL/L (ref 3.5–5.1)
SODIUM BLD-SCNC: 141 MMOL/L (ref 136–145)
TOTAL PROTEIN: 7.5 G/DL (ref 6.4–8.2)
TRIGL SERPL-MCNC: 100 MG/DL (ref 0–150)
TSH SERPL DL<=0.05 MIU/L-ACNC: 4.46 UIU/ML (ref 0.27–4.2)
VITAMIN D 25-HYDROXY: 62.8 NG/ML
VLDLC SERPL CALC-MCNC: 20 MG/DL

## 2019-07-09 ENCOUNTER — OFFICE VISIT (OUTPATIENT)
Dept: ENDOCRINOLOGY | Age: 74
End: 2019-07-09
Payer: MEDICARE

## 2019-07-09 VITALS
WEIGHT: 143.2 LBS | BODY MASS INDEX: 26.35 KG/M2 | HEART RATE: 71 BPM | OXYGEN SATURATION: 100 % | SYSTOLIC BLOOD PRESSURE: 116 MMHG | HEIGHT: 62 IN | DIASTOLIC BLOOD PRESSURE: 66 MMHG

## 2019-07-09 DIAGNOSIS — E55.9 VITAMIN D DEFICIENCY: ICD-10-CM

## 2019-07-09 DIAGNOSIS — R73.01 IMPAIRED FASTING GLUCOSE: ICD-10-CM

## 2019-07-09 DIAGNOSIS — E03.9 ACQUIRED HYPOTHYROIDISM: Primary | ICD-10-CM

## 2019-07-09 DIAGNOSIS — I10 ESSENTIAL HYPERTENSION: ICD-10-CM

## 2019-07-09 DIAGNOSIS — E78.00 PURE HYPERCHOLESTEROLEMIA: ICD-10-CM

## 2019-07-09 PROCEDURE — 99214 OFFICE O/P EST MOD 30 MIN: CPT | Performed by: INTERNAL MEDICINE

## 2019-07-09 RX ORDER — TRIAMTERENE AND HYDROCHLOROTHIAZIDE 37.5; 25 MG/1; MG/1
TABLET ORAL
Qty: 30 TABLET | Refills: 2 | Status: SHIPPED | OUTPATIENT
Start: 2019-07-09 | End: 2019-09-14

## 2019-07-09 NOTE — PROGRESS NOTES
double vision and photophobia. Respiratory: Negative for cough and hemoptysis. Cardiovascular: Negative for chest pain, palpitations and orthopnea. Genitourinary: Negative for dysuria, flank pain, frequency, hematuria and urgency. Musculoskeletal: Negative for back pain, falls, joint pain, myalgias and neck pain. Skin: Negative for itching and rash. Neurological: Negative for dizziness, tingling, tremors, sensory change, speech change, focal weakness, seizures, loss of consciousness and headaches. Endo/Heme/Allergies: Negative for environmental allergies and polydipsia. Does not bruise/bleed easily. Psychiatric/Behavioral: Negative for depression, hallucinations, memory loss, substance abuse and suicidal ideas. The patient is not nervous/anxious and does not have insomnia. Physical Exam   Constitutional: She is oriented to person, place, and time. She appears well-developed. No distress. Psychiatric: Her behavior is normal. Thought content normal.         Orders Only on 07/02/2019   Component Date Value Ref Range Status    Sodium 07/02/2019 141  136 - 145 mmol/L Final    Potassium 07/02/2019 3.5  3.5 - 5.1 mmol/L Final    Chloride 07/02/2019 100  99 - 110 mmol/L Final    CO2 07/02/2019 28  21 - 32 mmol/L Final    Anion Gap 07/02/2019 13  3 - 16 Final    Glucose 07/02/2019 101* 70 - 99 mg/dL Final    BUN 07/02/2019 16  7 - 20 mg/dL Final    CREATININE 07/02/2019 0.8  0.6 - 1.2 mg/dL Final    GFR Non- 07/02/2019 >60  >60 Final    Comment: >60 mL/min/1.73m2 EGFR, calc. for ages 25 and older using the  MDRD formula (not corrected for weight), is valid for stable  renal function.  GFR  07/02/2019 >60  >60 Final    Comment: Chronic Kidney Disease: less than 60 ml/min/1.73 sq.m. Kidney Failure: less than 15 ml/min/1.73 sq.m. Results valid for patients 18 years and older.       Calcium 07/02/2019 10.4  8.3 - 10.6 mg/dL Final    Total

## 2019-07-30 DIAGNOSIS — E55.9 VITAMIN D DEFICIENCY: Primary | ICD-10-CM

## 2019-07-30 RX ORDER — ERGOCALCIFEROL 1.25 MG/1
CAPSULE ORAL
Qty: 24 CAPSULE | Refills: 4 | Status: SHIPPED | OUTPATIENT
Start: 2019-07-30 | End: 2020-07-14 | Stop reason: SDUPTHER

## 2019-08-10 DIAGNOSIS — E78.00 PURE HYPERCHOLESTEROLEMIA: ICD-10-CM

## 2019-08-12 RX ORDER — PITAVASTATIN CALCIUM 2.09 MG/1
TABLET, FILM COATED ORAL
Qty: 15 TABLET | Refills: 3 | Status: SHIPPED | OUTPATIENT
Start: 2019-08-12 | End: 2020-03-09 | Stop reason: SDUPTHER

## 2019-09-14 DIAGNOSIS — I10 ESSENTIAL HYPERTENSION: ICD-10-CM

## 2019-09-14 RX ORDER — TRIAMTERENE AND HYDROCHLOROTHIAZIDE 37.5; 25 MG/1; MG/1
TABLET ORAL
Qty: 30 TABLET | Refills: 2 | Status: SHIPPED | OUTPATIENT
Start: 2019-09-14 | End: 2020-01-21

## 2019-09-16 RX ORDER — LEVOTHYROXINE SODIUM 0.05 MG/1
TABLET ORAL
Qty: 90 TABLET | Refills: 3 | Status: SHIPPED | OUTPATIENT
Start: 2019-09-16 | End: 2020-07-14 | Stop reason: SDUPTHER

## 2019-12-05 ENCOUNTER — HOSPITAL ENCOUNTER (OUTPATIENT)
Dept: WOMENS IMAGING | Age: 74
Discharge: HOME | End: 2019-12-05
Payer: MEDICARE

## 2019-12-05 DIAGNOSIS — R92.8 ABNORMAL MAMMOGRAM: ICD-10-CM

## 2019-12-05 PROCEDURE — G0279 TOMOSYNTHESIS, MAMMO: HCPCS

## 2020-01-02 RX ORDER — AMLODIPINE BESYLATE 5 MG/1
TABLET ORAL
Qty: 90 TABLET | Refills: 0 | Status: SHIPPED | OUTPATIENT
Start: 2020-01-02 | End: 2020-06-25

## 2020-01-16 DIAGNOSIS — R73.01 IMPAIRED FASTING GLUCOSE: ICD-10-CM

## 2020-01-16 DIAGNOSIS — E55.9 VITAMIN D DEFICIENCY: ICD-10-CM

## 2020-01-16 DIAGNOSIS — E03.9 ACQUIRED HYPOTHYROIDISM: ICD-10-CM

## 2020-01-16 DIAGNOSIS — E78.00 PURE HYPERCHOLESTEROLEMIA: ICD-10-CM

## 2020-01-16 LAB
A/G RATIO: 1.6 (ref 1.1–2.2)
ALBUMIN SERPL-MCNC: 4.7 G/DL (ref 3.4–5)
ALP BLD-CCNC: 74 U/L (ref 40–129)
ALT SERPL-CCNC: 21 U/L (ref 10–40)
ANION GAP SERPL CALCULATED.3IONS-SCNC: 16 MMOL/L (ref 3–16)
AST SERPL-CCNC: 23 U/L (ref 15–37)
BILIRUB SERPL-MCNC: 0.5 MG/DL (ref 0–1)
BUN BLDV-MCNC: 21 MG/DL (ref 7–20)
CALCIUM SERPL-MCNC: 10.2 MG/DL (ref 8.3–10.6)
CHLORIDE BLD-SCNC: 95 MMOL/L (ref 99–110)
CHOLESTEROL, TOTAL: 238 MG/DL (ref 0–199)
CO2: 25 MMOL/L (ref 21–32)
CREAT SERPL-MCNC: 0.8 MG/DL (ref 0.6–1.2)
GFR AFRICAN AMERICAN: >60
GFR NON-AFRICAN AMERICAN: >60
GLOBULIN: 2.9 G/DL
GLUCOSE BLD-MCNC: 101 MG/DL (ref 70–99)
HDLC SERPL-MCNC: 91 MG/DL (ref 40–60)
LDL CHOLESTEROL CALCULATED: 127 MG/DL
POTASSIUM SERPL-SCNC: 3.6 MMOL/L (ref 3.5–5.1)
SODIUM BLD-SCNC: 136 MMOL/L (ref 136–145)
TOTAL PROTEIN: 7.6 G/DL (ref 6.4–8.2)
TRIGL SERPL-MCNC: 102 MG/DL (ref 0–150)
VITAMIN D 25-HYDROXY: 73.5 NG/ML
VLDLC SERPL CALC-MCNC: 20 MG/DL

## 2020-01-18 LAB — TSH, 3RD GENERATION: 2.65 MU/L (ref 0.3–4)

## 2020-01-21 ENCOUNTER — OFFICE VISIT (OUTPATIENT)
Dept: ENDOCRINOLOGY | Age: 75
End: 2020-01-21
Payer: MEDICARE

## 2020-01-21 VITALS
BODY MASS INDEX: 26.72 KG/M2 | DIASTOLIC BLOOD PRESSURE: 81 MMHG | HEIGHT: 62 IN | WEIGHT: 145.2 LBS | SYSTOLIC BLOOD PRESSURE: 126 MMHG

## 2020-01-21 PROCEDURE — 99214 OFFICE O/P EST MOD 30 MIN: CPT | Performed by: INTERNAL MEDICINE

## 2020-01-21 NOTE — PROGRESS NOTES
Endocrinology    Isabell Galicia M.D. Phone: [phone]   FAX: [phone]       Xavier Santo   YOB: 1945    Date of Visit:  1/21/2020    Allergies   Allergen Reactions    Crestor [Rosuvastatin] Diarrhea     Leg cramps     Lipitor [Atorvastatin]      Leg cramps     Pravastatin Other (See Comments)     Muscle aches and leg cramps     Outpatient Medications Marked as Taking for the 1/21/20 encounter (Office Visit) with Jake Mcardle, MD   Medication Sig Dispense Refill    amLODIPine (NORVASC) 5 MG tablet TAKE ONE TABLET BY MOUTH EVERY DAY 90 tablet 0    omeprazole (PRILOSEC) 20 MG delayed release capsule TAKE ONE CAPSULE BY MOUTH DAILY 30 capsule 5    levothyroxine (SYNTHROID) 50 MCG tablet TAKE ONE TABLET BY MOUTH DAILY 90 tablet 3    triamterene-hydrochlorothiazide (MAXZIDE-25) 37.5-25 MG per tablet TAKE ONE TABLET BY MOUTH EVERY DAY 30 tablet 2    LIVALO 2 MG TABS tablet TAKE 1 TABLET EVERY OTHER DAY. 15 tablet 3    vitamin D (ERGOCALCIFEROL) 25663 units CAPS capsule TAKE ONE CAPSULE BY MOUTH TWICE A WEEK. (Patient taking differently: Take 50,000 Units by mouth once a week ) 24 capsule 4    letrozole (FEMARA) 2.5 MG tablet Take 2.5 mg by mouth daily      Multiple Vitamins-Minerals (MULTI-VITAMIN GUMMIES PO) Take by mouth      aspirin 81 MG tablet Take 81 mg by mouth daily. Vitals:    01/21/20 1023   BP: 126/81   Site: Left Upper Arm   Position: Sitting   Cuff Size: Medium Adult   Weight: 145 lb 3.2 oz (65.9 kg)   Height: 5' 2\" (1.575 m)     Body mass index is 26.56 kg/m².      Wt Readings from Last 3 Encounters:   01/21/20 145 lb 3.2 oz (65.9 kg)   07/30/19 143 lb 6.4 oz (65 kg)   07/09/19 143 lb 3.2 oz (65 kg)     BP Readings from Last 3 Encounters:   01/21/20 126/81   07/30/19 134/82   07/09/19 116/66        Past Medical History:   Diagnosis Date    Allergic rhinitis     Cancer [facility])     lumpectomy 11-18    Diverticulitis     Diverticulitis 3/1/16    GERD (gastroesophageal reflux disease)     Hearing loss     Hyperlipidemia     Hypertension     Osteoarthritis     Pancreatic mass tail     Past Surgical History:   Procedure Laterality Date    BLADDER SUSPENSION      COLONOSCOPY  2012    polyp-ghastine    HYSTERECTOMY  2007    HYSTERECTOMY, TOTAL ABDOMINAL       Family History   Problem Relation Age of Onset    Stroke Father      Social History     Tobacco Use   Smoking Status Former Smoker    Last attempt to quit: 2001    Years since quittin.1   Smokeless Tobacco Never Used      Social History     Substance and Sexual Activity   Alcohol Use Yes    Comment: socially       HPI    This 69 yrs old female is here for management of hyperlipidemia, vit D def, hypothyroidism. PCP Oksana Farr MD     She has a PMH of hyperlipidemia, HTN , hypothyroidism, Vit  Def, diverticulitis    Hyperlipidemia :   She has h/o hypercholesterolemia. She took lipitor 40mg then started experiencing myalgias in upper legs, especially at night. Then took Crestor 10mg which reproduced the symptoms. She was started on vit D  twice a week and was given crestor 20 mg daily with it. She tolerated it initially but then started having muscle pains and stopped it. Could not tolerate Pravastatin in      She is currently on Livalo 2 mg every other day. She is able to tolerate Livalo. No significant muscle aches     Hypothyroidism : She has hypothyroidism . On levothyroxine 50 mcg daily. No excessive fatigue   Weight is stable    Vitamin D def : She is on vitamin D 50,000 IU weekly. She was on 50,000 IU twice a week. Dose was adjusted as her levels were high normal.       Impaired fasting glucose :    She has h/o impaired fasting glucose  No significant FH of DM    Weight has been stable. She is on diet management. Review of Systems   Constitutional: Positive for malaise/fatigue.  Negative for chills, diaphoresis, fever and mg/dL Final    BUN 01/16/2020 21* 7 - 20 mg/dL Final    CREATININE 01/16/2020 0.8  0.6 - 1.2 mg/dL Final    GFR Non- 01/16/2020 >60  >60 Final    Comment: >60 mL/min/1.73m2 EGFR, calc. for ages 25 and older using the  MDRD formula (not corrected for weight), is valid for stable  renal function.  GFR  01/16/2020 >60  >60 Final    Comment: Chronic Kidney Disease: less than 60 ml/min/1.73 sq.m. Kidney Failure: less than 15 ml/min/1.73 sq.m. Results valid for patients 18 years and older.  Calcium 01/16/2020 10.2  8.3 - 10.6 mg/dL Final    Total Protein 01/16/2020 7.6  6.4 - 8.2 g/dL Final    Alb 01/16/2020 4.7  3.4 - 5.0 g/dL Final    Albumin/Globulin Ratio 01/16/2020 1.6  1.1 - 2.2 Final    Total Bilirubin 01/16/2020 0.5  0.0 - 1.0 mg/dL Final    Alkaline Phosphatase 01/16/2020 74  40 - 129 U/L Final    ALT 01/16/2020 21  10 - 40 U/L Final    AST 01/16/2020 23  15 - 37 U/L Final    Globulin 01/16/2020 2.9  g/dL Final    Cholesterol, Total 01/16/2020 238* 0 - 199 mg/dL Final    Triglycerides 01/16/2020 102  0 - 150 mg/dL Final    HDL 01/16/2020 91* 40 - 60 mg/dL Final    LDL Calculated 01/16/2020 127* <100 mg/dL Final    VLDL Cholesterol Calculated 01/16/2020 20  Not Established mg/dL Final       Assessment/Plan        1. Hyperlipidemia    This 69 yrs old female has h/o hyperlipidemia. She was not able to tolerate lipitor, Crestor, pravastatin     On livalo 2 mg every other day . --->90---> 117---> 115---> 158---> 114---> 127  --> 114--> 95---> 85---> 102---> 91  TGD 61---> 88---> 87----> 124---> 100---> 102  ---> 268---> 236--> 238     Continue same regimen. 2. Hypothyroidism. On levothyroxine 50 mg daily. TSH 3.15 ---> 3.46---> 4.46---> 2.65    Will continue the same dose        3. Vit  D def. Vitamin D 85.1---> 62.8---> 73.5     Continue same dose. 4. Imparied fasting glucose.  Glucose 104--->

## 2020-03-09 RX ORDER — PITAVASTATIN CALCIUM 2.09 MG/1
TABLET, FILM COATED ORAL
Qty: 45 TABLET | Refills: 3 | Status: SHIPPED | OUTPATIENT
Start: 2020-03-09 | End: 2020-07-14 | Stop reason: SDUPTHER

## 2020-04-23 ENCOUNTER — HOSPITAL ENCOUNTER (OUTPATIENT)
Dept: GENERAL RADIOLOGY | Age: 75
Discharge: HOME | End: 2020-04-23
Payer: MEDICARE

## 2020-04-23 ENCOUNTER — HOSPITAL ENCOUNTER (OUTPATIENT)
Age: 75
Discharge: HOME | End: 2020-04-23
Payer: MEDICARE

## 2020-04-23 ENCOUNTER — HOSPITAL ENCOUNTER (OUTPATIENT)
Dept: NUCLEAR MEDICINE | Age: 75
Discharge: HOME | End: 2020-04-23
Payer: MEDICARE

## 2020-04-23 PROCEDURE — 78306 BONE IMAGING WHOLE BODY: CPT

## 2020-04-23 PROCEDURE — 71046 X-RAY EXAM CHEST 2 VIEWS: CPT

## 2020-04-23 PROCEDURE — 3430000000 HC RX DIAGNOSTIC RADIOPHARMACEUTICAL: Performed by: INTERNAL MEDICINE

## 2020-04-23 PROCEDURE — A9503 TC99M MEDRONATE: HCPCS | Performed by: INTERNAL MEDICINE

## 2020-04-23 RX ORDER — TC 99M MEDRONATE 20 MG/10ML
25 INJECTION, POWDER, LYOPHILIZED, FOR SOLUTION INTRAVENOUS
Status: COMPLETED | OUTPATIENT
Start: 2020-04-23 | End: 2020-04-23

## 2020-04-23 RX ADMIN — TC 99M MEDRONATE 25 MILLICURIE: 20 INJECTION, POWDER, LYOPHILIZED, FOR SOLUTION INTRAVENOUS at 07:32

## 2020-06-25 RX ORDER — AMLODIPINE BESYLATE 5 MG/1
TABLET ORAL
Qty: 90 TABLET | Refills: 2 | Status: SHIPPED | OUTPATIENT
Start: 2020-06-25 | End: 2020-10-27 | Stop reason: SDUPTHER

## 2020-07-07 DIAGNOSIS — E03.9 ACQUIRED HYPOTHYROIDISM: ICD-10-CM

## 2020-07-07 DIAGNOSIS — E78.00 PURE HYPERCHOLESTEROLEMIA: ICD-10-CM

## 2020-07-07 DIAGNOSIS — R73.01 IMPAIRED FASTING GLUCOSE: ICD-10-CM

## 2020-07-07 LAB
A/G RATIO: 1.9 (ref 1.1–2.2)
ALBUMIN SERPL-MCNC: 4.5 G/DL (ref 3.4–5)
ALP BLD-CCNC: 76 U/L (ref 40–129)
ALT SERPL-CCNC: 17 U/L (ref 10–40)
ANION GAP SERPL CALCULATED.3IONS-SCNC: 11 MMOL/L (ref 3–16)
AST SERPL-CCNC: 21 U/L (ref 15–37)
BILIRUB SERPL-MCNC: 0.4 MG/DL (ref 0–1)
BUN BLDV-MCNC: 20 MG/DL (ref 7–20)
CALCIUM SERPL-MCNC: 10 MG/DL (ref 8.3–10.6)
CHLORIDE BLD-SCNC: 98 MMOL/L (ref 99–110)
CHOLESTEROL, TOTAL: 256 MG/DL (ref 0–199)
CO2: 28 MMOL/L (ref 21–32)
CREAT SERPL-MCNC: 0.8 MG/DL (ref 0.6–1.2)
GFR AFRICAN AMERICAN: >60
GFR NON-AFRICAN AMERICAN: >60
GLOBULIN: 2.4 G/DL
GLUCOSE BLD-MCNC: 93 MG/DL (ref 70–99)
HDLC SERPL-MCNC: 95 MG/DL (ref 40–60)
LDL CHOLESTEROL CALCULATED: 138 MG/DL
POTASSIUM SERPL-SCNC: 3.9 MMOL/L (ref 3.5–5.1)
SODIUM BLD-SCNC: 137 MMOL/L (ref 136–145)
TOTAL PROTEIN: 6.9 G/DL (ref 6.4–8.2)
TRIGL SERPL-MCNC: 116 MG/DL (ref 0–150)
TSH SERPL DL<=0.05 MIU/L-ACNC: 2.45 UIU/ML (ref 0.27–4.2)
VLDLC SERPL CALC-MCNC: 23 MG/DL

## 2020-07-14 ENCOUNTER — VIRTUAL VISIT (OUTPATIENT)
Dept: ENDOCRINOLOGY | Age: 75
End: 2020-07-14
Payer: MEDICARE

## 2020-07-14 PROCEDURE — 99213 OFFICE O/P EST LOW 20 MIN: CPT | Performed by: INTERNAL MEDICINE

## 2020-07-14 RX ORDER — LEVOTHYROXINE SODIUM 0.05 MG/1
TABLET ORAL
Qty: 90 TABLET | Refills: 3 | Status: SHIPPED | OUTPATIENT
Start: 2020-07-14 | End: 2021-01-21 | Stop reason: SDUPTHER

## 2020-07-14 RX ORDER — ERGOCALCIFEROL 1.25 MG/1
50000 CAPSULE ORAL WEEKLY
Qty: 12 CAPSULE | Refills: 3 | Status: SHIPPED | OUTPATIENT
Start: 2020-07-14 | End: 2021-01-21 | Stop reason: SDUPTHER

## 2020-07-14 RX ORDER — PITAVASTATIN CALCIUM 2.09 MG/1
TABLET, FILM COATED ORAL
Qty: 45 TABLET | Refills: 3 | Status: SHIPPED | OUTPATIENT
Start: 2020-07-14 | End: 2021-07-15 | Stop reason: SDUPTHER

## 2020-07-14 NOTE — PROGRESS NOTES
Endocrinology    Jack Wilder M.D. Phone: [phone]   FAX: [phone]       Luis Rahman   YOB: 1945    Date of Visit:  7/14/2020    Allergies   Allergen Reactions    Azithromycin Nausea Only     Other reaction(s): Muscle Aches    Crestor [Rosuvastatin] Diarrhea     Leg cramps     Lipitor [Atorvastatin]      Leg cramps     Pravastatin Other (See Comments)     Muscle aches and leg cramps     Outpatient Medications Marked as Taking for the 7/14/20 encounter (Virtual Visit) with Scot Patel MD   Medication Sig Dispense Refill    omeprazole (PRILOSEC) 20 MG delayed release capsule TAKE ONE CAPSULE BY MOUTH EVERY DAY 30 capsule 5    amLODIPine (NORVASC) 5 MG tablet TAKE ONE TABLET BY MOUTH EVERY DAY 90 tablet 2    pitavastatin (LIVALO) 2 MG TABS tablet TAKE 1 TABLET EVERY OTHER DAY. 45 tablet 3    triamterene-hydrochlorothiazide (MAXZIDE-25) 37.5-25 MG per tablet TAKE ONE TABLET BY MOUTH EVERY DAY 90 tablet 1    levothyroxine (SYNTHROID) 50 MCG tablet TAKE ONE TABLET BY MOUTH DAILY 90 tablet 3    vitamin D (ERGOCALCIFEROL) 94301 units CAPS capsule TAKE ONE CAPSULE BY MOUTH TWICE A WEEK. (Patient taking differently: Take 50,000 Units by mouth once a week ) 24 capsule 4    letrozole (FEMARA) 2.5 MG tablet Take 2.5 mg by mouth daily      Multiple Vitamins-Minerals (MULTI-VITAMIN GUMMIES PO) Take by mouth      aspirin 81 MG tablet Take 81 mg by mouth daily. There were no vitals filed for this visit. There is no height or weight on file to calculate BMI.      Wt Readings from Last 3 Encounters:   01/23/20 144 lb 9.6 oz (65.6 kg)   01/21/20 145 lb 3.2 oz (65.9 kg)   07/30/19 143 lb 6.4 oz (65 kg)     BP Readings from Last 3 Encounters:   01/23/20 130/82   01/21/20 126/81   07/30/19 134/82        Past Medical History:   Diagnosis Date    Allergic rhinitis     Cancer [facility])     lumpectomy 11-18    Diverticulitis     Diverticulitis 3/1/16    GERD (gastroesophageal stopped it. Could not tolerate Pravastatin in 02/18     She is currently on Livalo 2 mg every other day. She is able to tolerate Livalo. No significant muscle aches     Hypothyroidism : She has hypothyroidism . On levothyroxine 50 mcg daily. No excessive fatigue   Weight is stable    Vitamin D def : She is on vitamin D 50,000 IU weekly. She was on 50,000 IU twice a week. Dose was adjusted as her levels were high normal.       Impaired fasting glucose :    She has h/o impaired fasting glucose  No significant FH of DM    Weight has been stable. She is on diet management. Review of Systems   Constitutional: Positive for malaise/fatigue. Negative for chills, diaphoresis, fever and weight loss. Eyes: Negative for blurred vision, double vision and photophobia. Respiratory: Negative for cough and hemoptysis. Cardiovascular: Negative for chest pain, palpitations and orthopnea. Genitourinary: Negative for dysuria, flank pain, frequency, hematuria and urgency. Musculoskeletal: Negative for back pain, falls, joint pain, myalgias and neck pain. Skin: Negative for itching and rash. Neurological: Negative for dizziness, tingling, tremors, sensory change, speech change, focal weakness, seizures, loss of consciousness and headaches. Endo/Heme/Allergies: Negative for environmental allergies and polydipsia. Does not bruise/bleed easily. Psychiatric/Behavioral: Negative for depression, hallucinations, memory loss, substance abuse and suicidal ideas. The patient is not nervous/anxious and does not have insomnia. Physical Exam   Constitutional: She is oriented to person, place, and time. She appears well-developed. No distress.      Psychiatric: Her behavior is normal. Thought content normal.         Orders Only on 07/07/2020   Component Date Value Ref Range Status    TSH 07/07/2020 2.45  0.27 - 4.20 uIU/mL Final    Cholesterol, Total 07/07/2020 256* 0 - 199 mg/dL Final    Triglycerides 07/07/2020 116  0 - 150 mg/dL Final    HDL 07/07/2020 95* 40 - 60 mg/dL Final    LDL Calculated 07/07/2020 138* <100 mg/dL Final    VLDL Cholesterol Calculated 07/07/2020 23  Not Established mg/dL Final    Sodium 07/07/2020 137  136 - 145 mmol/L Final    Potassium 07/07/2020 3.9  3.5 - 5.1 mmol/L Final    Chloride 07/07/2020 98* 99 - 110 mmol/L Final    CO2 07/07/2020 28  21 - 32 mmol/L Final    Anion Gap 07/07/2020 11  3 - 16 Final    Glucose 07/07/2020 93  70 - 99 mg/dL Final    BUN 07/07/2020 20  7 - 20 mg/dL Final    CREATININE 07/07/2020 0.8  0.6 - 1.2 mg/dL Final    GFR Non- 07/07/2020 >60  >60 Final    Comment: >60 mL/min/1.73m2 EGFR, calc. for ages 25 and older using the  MDRD formula (not corrected for weight), is valid for stable  renal function.  GFR  07/07/2020 >60  >60 Final    Comment: Chronic Kidney Disease: less than 60 ml/min/1.73 sq.m. Kidney Failure: less than 15 ml/min/1.73 sq.m. Results valid for patients 18 years and older.  Calcium 07/07/2020 10.0  8.3 - 10.6 mg/dL Final    Total Protein 07/07/2020 6.9  6.4 - 8.2 g/dL Final    Alb 07/07/2020 4.5  3.4 - 5.0 g/dL Final    Albumin/Globulin Ratio 07/07/2020 1.9  1.1 - 2.2 Final    Total Bilirubin 07/07/2020 0.4  0.0 - 1.0 mg/dL Final    Alkaline Phosphatase 07/07/2020 76  40 - 129 U/L Final    ALT 07/07/2020 17  10 - 40 U/L Final    AST 07/07/2020 21  15 - 37 U/L Final    Globulin 07/07/2020 2.4  g/dL Final       Assessment/Plan        1. Hyperlipidemia    This 77 yrs old female has h/o hyperlipidemia. She was not able to tolerate lipitor, Crestor, pravastatin     On livalo 2 mg every other day . --->90---> 117---> 115---> 158---> 114---> 127--> 138  --> 114--> 95---> 85---> 102---> 91--> 95  TGD 61---> 88---> 87----> 124---> 100---> 102---> 116  ---> 268---> 236--> 238     Continue same regimen. 2. Hypothyroidism.      On levothyroxine 50 mg daily. TSH 3.15 ---> 3.46---> 4.46---> 2.65---> 2.45    Will continue the same dose        3. Vit  D def. Vitamin D 85.1---> 62.8---> 73.5     Continue same dose. 4. Imparied fasting glucose. Glucose 104---> 100---> 103---> 101---> 101---> 92  Continue diet management. Will monitor fasting glucose. 5. Beast ca. As per oncology.      RTC 6 months

## 2020-12-17 ENCOUNTER — HOSPITAL ENCOUNTER (OUTPATIENT)
Dept: WOMENS IMAGING | Age: 75
Discharge: HOME | End: 2020-12-17
Payer: MEDICARE

## 2020-12-17 PROCEDURE — 77063 BREAST TOMOSYNTHESIS BI: CPT

## 2021-01-15 ENCOUNTER — TELEPHONE (OUTPATIENT)
Dept: ENDOCRINOLOGY | Age: 76
End: 2021-01-15

## 2021-01-18 ENCOUNTER — TELEPHONE (OUTPATIENT)
Dept: ENDOCRINOLOGY | Age: 76
End: 2021-01-18

## 2021-01-18 DIAGNOSIS — E78.49 OTHER HYPERLIPIDEMIA: Primary | ICD-10-CM

## 2021-01-18 NOTE — TELEPHONE ENCOUNTER
Patient called and said she usually gets her labs done before her appt. She has an appt with dr. Mack Null on 1-28-21 at 4:20.           Dr. Asia Harry patient

## 2021-01-21 ENCOUNTER — OFFICE VISIT (OUTPATIENT)
Dept: ENDOCRINOLOGY | Age: 76
End: 2021-01-21
Payer: MEDICARE

## 2021-01-21 VITALS
OXYGEN SATURATION: 97 % | DIASTOLIC BLOOD PRESSURE: 74 MMHG | RESPIRATION RATE: 18 BRPM | HEART RATE: 89 BPM | BODY MASS INDEX: 27.02 KG/M2 | SYSTOLIC BLOOD PRESSURE: 112 MMHG | WEIGHT: 146.8 LBS | HEIGHT: 62 IN

## 2021-01-21 DIAGNOSIS — E03.9 ACQUIRED HYPOTHYROIDISM: ICD-10-CM

## 2021-01-21 DIAGNOSIS — E55.9 VITAMIN D DEFICIENCY: ICD-10-CM

## 2021-01-21 DIAGNOSIS — R73.01 IMPAIRED FASTING BLOOD SUGAR: ICD-10-CM

## 2021-01-21 DIAGNOSIS — I10 ESSENTIAL HYPERTENSION: ICD-10-CM

## 2021-01-21 DIAGNOSIS — E78.49 OTHER HYPERLIPIDEMIA: Primary | ICD-10-CM

## 2021-01-21 PROCEDURE — 99215 OFFICE O/P EST HI 40 MIN: CPT | Performed by: INTERNAL MEDICINE

## 2021-01-21 RX ORDER — ERGOCALCIFEROL 1.25 MG/1
50000 CAPSULE ORAL WEEKLY
Qty: 12 CAPSULE | Refills: 3 | Status: SHIPPED | OUTPATIENT
Start: 2021-01-21 | End: 2021-07-15 | Stop reason: SDUPTHER

## 2021-01-21 RX ORDER — LEVOTHYROXINE SODIUM 0.05 MG/1
TABLET ORAL
Qty: 90 TABLET | Refills: 3 | Status: SHIPPED | OUTPATIENT
Start: 2021-01-21 | End: 2021-07-15 | Stop reason: SDUPTHER

## 2021-01-21 NOTE — PROGRESS NOTES
This 77 yrs old female is here for management of hyperlipidemia, vit D def, hypothyroidism, impaired fasting    Seen as new patient to be established    She was seeing Dr. Shelby Ma      Hyperlipidemia :   She has h/o hypercholesterolemia for years    She took lipitor 40mg then started experiencing myalgias in upper legs, especially at night. Then took Crestor 10mg which reproduced the symptoms. She was started on vit D  twice a week and was given crestor 20 mg daily with it. She tolerated it initially but then started having muscle pains and stopped it. Could not tolerate Pravastatin in 02/18     She is currently on Livalo 2 mg every other day. She is able to tolerate Livalo. No significant muscle aches     Mild, controlled, no worsening factors    ----> 154--->90---> 117---> 115---> 158---> 114---> 127--> 138  --> 114--> 95---> 85---> 102---> 91--> 95  TGD 61---> 88---> 87----> 124---> 100---> 102---> 116  TC  337--->  227---> 268---> 236--> 238 ---> 256    She lemuel exercise    Hypothyroidism : She has hypothyroidism . On levothyroxine 50 mcg daily. For years, stable    On levothyroxine 50 mg daily. TSH 3.15 ---> 3.46---> 4.46---> 2.65---> 2.45    Vitamin D def : She is on vitamin D 50,000 IU weekly. She was on 50,000 IU twice a week. Dose was adjusted as her levels were high normal.     Vitamin D 85.1---> 62.8---> 73.5      Impaired fasting glucose :  Glucose 104---> 100---> 103---> 101---> 101---> 92  She has h/o impaired fasting glucose  No significant FH of DM    Weight has been stable. She is on diet management.      She has osteopenia, follows with Dr. David Miles    Records from Dr. Tayo Moreland and Dr. Shelby Ma,  labs reviewed    She has osteopenia, followed by Dr. Jermaine Collado 600mg   She has HTN, on norvasc and triamterene/HCTZ  Stable    FH: Dad 73 years had CVA  Not sure of hyperlipidemia  No siblings    SH: Quit smoking 2002  31 ys 5 cig/day no drugs    Past 07/07/2020 28  21 - 32 mmol/L Final    Anion Gap 07/07/2020 11  3 - 16 Final    Glucose 07/07/2020 93  70 - 99 mg/dL Final    BUN 07/07/2020 20  7 - 20 mg/dL Final    CREATININE 07/07/2020 0.8  0.6 - 1.2 mg/dL Final    GFR Non- 07/07/2020 >60  >60 Final    Comment: >60 mL/min/1.73m2 EGFR, calc. for ages 25 and older using the  MDRD formula (not corrected for weight), is valid for stable  renal function.  GFR  07/07/2020 >60  >60 Final    Comment: Chronic Kidney Disease: less than 60 ml/min/1.73 sq.m. Kidney Failure: less than 15 ml/min/1.73 sq.m. Results valid for patients 18 years and older.  Calcium 07/07/2020 10.0  8.3 - 10.6 mg/dL Final    Total Protein 07/07/2020 6.9  6.4 - 8.2 g/dL Final    Alb 07/07/2020 4.5  3.4 - 5.0 g/dL Final    Albumin/Globulin Ratio 07/07/2020 1.9  1.1 - 2.2 Final    Total Bilirubin 07/07/2020 0.4  0.0 - 1.0 mg/dL Final    Alkaline Phosphatase 07/07/2020 76  40 - 129 U/L Final    ALT 07/07/2020 17  10 - 40 U/L Final    AST 07/07/2020 21  15 - 37 U/L Final    Globulin 07/07/2020 2.4  g/dL Final       Assessment:     1. Hyperlipidemia: This 77 yrs old female has h/o hyperlipidemia. She was not able to tolerate lipitor, Crestor, pravastatin   On livalo 2 mg every other day . Tolerating now. Needs repeat lipids  If high, can try livalo daily as was better controlled on daily  No h/o CAD or CVA. May consider Zetia, welchol or PCKS-9 inhibitor if needed    2. Hypothyroidism: On levothyroxine, check TSH    3. Vit  D deficiency: On weekly supplement, monitor. She was on twice a week prior to it, level was 73 dose changed at that time. 4. Imparied fasting glucose: Will check A1c. Recommend CHO restriction and weight loss    5. Beast ca. As per oncology. 6.HTN: Stable    7. Osteopenia: Follows Dr. Marla Pepper, also on Femara. Recommend Ca supplement 500mg daily. States Dexa schedule 7/21     Plan: 1. Order lipids  2. TSH level, continue levothyroxine  3. Vitamin D level  4. Fasting glucose and A1c

## 2021-02-12 ENCOUNTER — IMMUNIZATION (OUTPATIENT)
Dept: PRIMARY CARE CLINIC | Age: 76
End: 2021-02-12
Payer: MEDICARE

## 2021-02-12 PROCEDURE — 91301 COVID-19, MODERNA VACCINE 100MCG/0.5ML DOSE: CPT | Performed by: FAMILY MEDICINE

## 2021-02-12 PROCEDURE — 0011A COVID-19, MODERNA VACCINE 100MCG/0.5ML DOSE: CPT | Performed by: FAMILY MEDICINE

## 2021-03-09 ENCOUNTER — TELEPHONE (OUTPATIENT)
Dept: ENDOCRINOLOGY | Age: 76
End: 2021-03-09

## 2021-03-09 NOTE — TELEPHONE ENCOUNTER
PA submitted for livalo today and approved:    Approvedtoday   CaseId:[id number];Status:Approved; Review Type:Prior Auth; Coverage Start Date:02/07/2021; Coverage End Date:03/09/2022

## 2021-03-12 ENCOUNTER — IMMUNIZATION (OUTPATIENT)
Dept: PRIMARY CARE CLINIC | Age: 76
End: 2021-03-12
Payer: MEDICARE

## 2021-03-12 PROCEDURE — 91301 COVID-19, MODERNA VACCINE 100MCG/0.5ML DOSE: CPT | Performed by: FAMILY MEDICINE

## 2021-03-12 PROCEDURE — 0012A COVID-19, MODERNA VACCINE 100MCG/0.5ML DOSE: CPT | Performed by: FAMILY MEDICINE

## 2021-03-30 DIAGNOSIS — E03.9 ACQUIRED HYPOTHYROIDISM: ICD-10-CM

## 2021-03-30 DIAGNOSIS — E55.9 VITAMIN D DEFICIENCY: ICD-10-CM

## 2021-03-30 DIAGNOSIS — E78.49 OTHER HYPERLIPIDEMIA: ICD-10-CM

## 2021-03-30 DIAGNOSIS — R73.01 IMPAIRED FASTING BLOOD SUGAR: ICD-10-CM

## 2021-03-30 LAB
ANION GAP SERPL CALCULATED.3IONS-SCNC: 8 MMOL/L (ref 3–16)
BUN BLDV-MCNC: 20 MG/DL (ref 7–20)
CALCIUM SERPL-MCNC: 9.8 MG/DL (ref 8.3–10.6)
CHLORIDE BLD-SCNC: 100 MMOL/L (ref 99–110)
CHOLESTEROL, TOTAL: 241 MG/DL (ref 0–199)
CO2: 31 MMOL/L (ref 21–32)
CREAT SERPL-MCNC: 0.8 MG/DL (ref 0.6–1.2)
GFR AFRICAN AMERICAN: >60
GFR NON-AFRICAN AMERICAN: >60
GLUCOSE BLD-MCNC: 98 MG/DL (ref 70–99)
HDLC SERPL-MCNC: 92 MG/DL (ref 40–60)
LDL CHOLESTEROL CALCULATED: 133 MG/DL
POTASSIUM SERPL-SCNC: 3.8 MMOL/L (ref 3.5–5.1)
SODIUM BLD-SCNC: 139 MMOL/L (ref 136–145)
TRIGL SERPL-MCNC: 79 MG/DL (ref 0–150)
TSH REFLEX: 2.11 UIU/ML (ref 0.27–4.2)
VITAMIN D 25-HYDROXY: 68.3 NG/ML
VLDLC SERPL CALC-MCNC: 16 MG/DL

## 2021-03-31 LAB
ESTIMATED AVERAGE GLUCOSE: 114 MG/DL
HBA1C MFR BLD: 5.6 %

## 2021-07-13 ENCOUNTER — HOSPITAL ENCOUNTER (OUTPATIENT)
Dept: WOMENS IMAGING | Age: 76
Discharge: HOME | End: 2021-07-13
Payer: MEDICARE

## 2021-07-13 DIAGNOSIS — C50.212 MALIGNANT NEOPLASM OF UPPER-INNER QUADRANT OF LEFT BREAST IN FEMALE, ESTROGEN RECEPTOR POSITIVE (HCC): ICD-10-CM

## 2021-07-13 DIAGNOSIS — E55.9 VITAMIN D DEFICIENCY: ICD-10-CM

## 2021-07-13 DIAGNOSIS — E03.9 ACQUIRED HYPOTHYROIDISM: ICD-10-CM

## 2021-07-13 DIAGNOSIS — E78.49 OTHER HYPERLIPIDEMIA: ICD-10-CM

## 2021-07-13 DIAGNOSIS — M85.89 OTHER SPECIFIED DISORDERS OF BONE DENSITY AND STRUCTURE, MULTIPLE SITES: ICD-10-CM

## 2021-07-13 DIAGNOSIS — Z78.0 POSTMENOPAUSAL STATUS: ICD-10-CM

## 2021-07-13 DIAGNOSIS — R73.01 IMPAIRED FASTING BLOOD SUGAR: ICD-10-CM

## 2021-07-13 DIAGNOSIS — Z17.0 MALIGNANT NEOPLASM OF UPPER-INNER QUADRANT OF LEFT BREAST IN FEMALE, ESTROGEN RECEPTOR POSITIVE (HCC): ICD-10-CM

## 2021-07-13 LAB
ANION GAP SERPL CALCULATED.3IONS-SCNC: 13 MMOL/L (ref 3–16)
BUN BLDV-MCNC: 15 MG/DL (ref 7–20)
CALCIUM SERPL-MCNC: 9.4 MG/DL (ref 8.3–10.6)
CHLORIDE BLD-SCNC: 98 MMOL/L (ref 99–110)
CHOLESTEROL, TOTAL: 261 MG/DL (ref 0–199)
CO2: 27 MMOL/L (ref 21–32)
CREAT SERPL-MCNC: 0.7 MG/DL (ref 0.6–1.2)
GFR AFRICAN AMERICAN: >60
GFR NON-AFRICAN AMERICAN: >60
GLUCOSE BLD-MCNC: 89 MG/DL (ref 70–99)
HDLC SERPL-MCNC: 108 MG/DL (ref 40–60)
LDL CHOLESTEROL CALCULATED: 136 MG/DL
POTASSIUM SERPL-SCNC: 3.9 MMOL/L (ref 3.5–5.1)
SODIUM BLD-SCNC: 138 MMOL/L (ref 136–145)
TRIGL SERPL-MCNC: 86 MG/DL (ref 0–150)
TSH REFLEX: 2.78 UIU/ML (ref 0.27–4.2)
VITAMIN D 25-HYDROXY: 78.1 NG/ML
VLDLC SERPL CALC-MCNC: 17 MG/DL

## 2021-07-13 PROCEDURE — 77080 DXA BONE DENSITY AXIAL: CPT

## 2021-07-14 LAB
ESTIMATED AVERAGE GLUCOSE: 114 MG/DL
HBA1C MFR BLD: 5.6 %

## 2021-07-15 ENCOUNTER — OFFICE VISIT (OUTPATIENT)
Dept: ENDOCRINOLOGY | Age: 76
End: 2021-07-15
Payer: MEDICARE

## 2021-07-15 VITALS
SYSTOLIC BLOOD PRESSURE: 134 MMHG | OXYGEN SATURATION: 97 % | WEIGHT: 148.8 LBS | HEART RATE: 92 BPM | HEIGHT: 62 IN | DIASTOLIC BLOOD PRESSURE: 68 MMHG | BODY MASS INDEX: 27.38 KG/M2

## 2021-07-15 DIAGNOSIS — E03.9 ACQUIRED HYPOTHYROIDISM: Primary | ICD-10-CM

## 2021-07-15 DIAGNOSIS — E78.00 PURE HYPERCHOLESTEROLEMIA: ICD-10-CM

## 2021-07-15 DIAGNOSIS — E55.9 VITAMIN D DEFICIENCY: ICD-10-CM

## 2021-07-15 PROCEDURE — 99214 OFFICE O/P EST MOD 30 MIN: CPT | Performed by: INTERNAL MEDICINE

## 2021-07-15 RX ORDER — LEVOTHYROXINE SODIUM 0.05 MG/1
TABLET ORAL
Qty: 90 TABLET | Refills: 3 | Status: SHIPPED | OUTPATIENT
Start: 2021-07-15 | End: 2022-01-20 | Stop reason: SDUPTHER

## 2021-07-15 RX ORDER — PITAVASTATIN CALCIUM 2.09 MG/1
TABLET, FILM COATED ORAL
Qty: 90 TABLET | Refills: 3 | Status: SHIPPED | OUTPATIENT
Start: 2021-07-15 | End: 2022-01-20 | Stop reason: SDUPTHER

## 2021-07-15 RX ORDER — ERGOCALCIFEROL 1.25 MG/1
50000 CAPSULE ORAL WEEKLY
Qty: 12 CAPSULE | Refills: 3 | Status: SHIPPED | OUTPATIENT
Start: 2021-07-15 | End: 2022-01-20 | Stop reason: SDUPTHER

## 2021-07-15 NOTE — PROGRESS NOTES
This 77 yrs old female is here for management of hyperlipidemia, vit D def, hypothyroidism, impaired fasting    She was seeing Dr. Taylor Post    Interim:  Still taking livalo QOD    Hyperlipidemia :   She has h/o hypercholesterolemia for years    She took lipitor 40mg then started experiencing myalgias in upper legs, especially at night. Then took Crestor 10mg which reproduced the symptoms. She was started on vit D  twice a week and was given crestor 20 mg daily with it. She tolerated it initially but then started having muscle pains and stopped it. Could not tolerate Pravastatin in 02/18     She is currently on Livalo 2 mg every other day. She is able to tolerate Livalo. No significant muscle aches     Mild, controlled, no worsening factors    ----> 154--->90---> 117---> 115---> 158---> 114---> 127--> 138  --> 114--> 95---> 85---> 102---> 91--> 95  TGD 61---> 88---> 87----> 124---> 100---> 102---> 116  TC  337--->  227---> 268---> 236--> 238 ---> 256    She does exercise    1/21       7/21       Hypothyroidism : She has hypothyroidism . On levothyroxine 50 mcg daily. For years, stable    On levothyroxine 50 mg daily. TSH 3.15 ---> 3.46---> 4.46---> 2.65---> 2.45    Vitamin D def : She is on vitamin D 50,000 IU weekly. She was on 50,000 IU twice a week. Dose was adjusted as her levels were high normal.     Vitamin D 85.1---> 62.8---> 73.5---> 68---> 78.1      Impaired fasting glucose :  Glucose 104---> 100---> 103---> 101---> 101---> 92---> 89  She has h/o impaired fasting glucose  No significant FH of DM    Weight has been stable. She is on diet management.      She has osteopenia, follows with Dr. Eric Lockett    Records from Dr. Rachelle Burr and Dr. Taylor Post,  labs reviewed    She has osteopenia, followed by Dr. Alvarez Right Ca 600mg   She has HTN, on norvasc and triamterene/HCTZ  Stable    7/21  LUMBAR SPINE:       The bone mineral density in the lumbar spine including the L1-L4 levels is   measured at 0.98 g/cm2, which corresponds to a T-score of -0.6 and a Z-score   of 1.8.  This is within the normal range by WHO criteria.       No great change from the comparison       RIGHT HIP:       The bone mineral density in the total hip is measured at 0.77 g/cm2   corresponding to a T-score of -1.4 and a Z-score of 0.4.  This is within the   osteopenia range by WHO criteria.       The bone mineral density of the femoral neck is measured at 0.56 g/cm2   corresponding to a T-score of -2.6 and a Z-score of -0.5.  This is within the   osteoporosis range by WHO criteria. FH: Dad 68 years had CVA  Not sure of hyperlipidemia  No siblings    SH: Quit smoking 2002  31 ys 5 cig/day no drugs    Past Medical History:   Diagnosis Date    Allergic rhinitis     Cancer ([facility] [address]     lumpectomy 11-18    Diverticulitis     Diverticulitis 3/1/16    GERD (gastroesophageal reflux disease)     Hearing loss     Hyperlipidemia     Hypertension     Osteoarthritis     Pancreatic mass tail     Past Surgical History:   Procedure Laterality Date    BLADDER SUSPENSION  2007    COLONOSCOPY  1/2012    polyp-ghastine    HYSTERECTOMY  2007    HYSTERECTOMY, TOTAL ABDOMINAL         Review of Systems   Scanned, reviewed    There were no vitals filed for this visit. There were no vitals filed for this visit. Constitutional: Well-developed, appears stated age, cooperative, in no acute distress  H/E/N/M/T:atraumatic, normocephalic, external ears, nose, lips normal without lesions  Eyes: Lids, lashes, conjunctivae and sclerae normal, No proptosis, no redness  Neck: supple, symmetrical, no swelling  Skin: No obvious rashes or lesions present.   Skin and hair texture normal  Psychiatric: Judgement and Insight:  judgement and insight appear normal  Neuro: Normal without focal findings, speech is normal normal, speech is spontaneous  Chest: No labored breathing, no chest deformity, no stridor  Musculoskeletal: No joint deformity, swelling      Orders Only on 07/13/2021   Component Date Value Ref Range Status    Vit D, 25-Hydroxy 07/13/2021 78.1  >=30 ng/mL Final    Comment: <=20 ng/mL. ........... Lon Salter Deficient  21-29 ng/mL. ......... Lon Salter Insufficient  >=30 ng/mL. ........ Lon Salter Sufficient      Hemoglobin A1C 07/13/2021 5.6  See comment % Final    Comment: Comment:  Diagnosis of Diabetes: > or = 6.5%  Increased risk of diabetes (Prediabetes): 5.7-6.4%  Glycemic Control: Nonpregnant Adults: <7.0%                    Pregnant: <6.0%        eAG 07/13/2021 114.0  mg/dL Final    Sodium 07/13/2021 138  136 - 145 mmol/L Final    Potassium 07/13/2021 3.9  3.5 - 5.1 mmol/L Final    Chloride 07/13/2021 98* 99 - 110 mmol/L Final    CO2 07/13/2021 27  21 - 32 mmol/L Final    Anion Gap 07/13/2021 13  3 - 16 Final    Glucose 07/13/2021 89  70 - 99 mg/dL Final    BUN 07/13/2021 15  7 - 20 mg/dL Final    CREATININE 07/13/2021 0.7  0.6 - 1.2 mg/dL Final    GFR Non- 07/13/2021 >60  >60 Final    Comment: >60 mL/min/1.73m2 EGFR, calc. for ages 25 and older using the  MDRD formula (not corrected for weight), is valid for stable  renal function.  GFR  07/13/2021 >60  >60 Final    Comment: Chronic Kidney Disease: less than 60 ml/min/1.73 sq.m. Kidney Failure: less than 15 ml/min/1.73 sq.m. Results valid for patients 18 years and older.       Calcium 07/13/2021 9.4  8.3 - 10.6 mg/dL Final    TSH 07/13/2021 2.78  0.27 - 4.20 uIU/mL Final    Cholesterol, Total 07/13/2021 261* 0 - 199 mg/dL Final    Triglycerides 07/13/2021 86  0 - 150 mg/dL Final    HDL 07/13/2021 108* 40 - 60 mg/dL Final    LDL Calculated 07/13/2021 136* <100 mg/dL Final    VLDL Cholesterol Calculated 07/13/2021 17  Not Established mg/dL Final   Orders Only on 03/30/2021   Component Date Value Ref Range Status    Cholesterol, Total 03/30/2021 241* 0 - 199 mg/dL Final    Triglycerides 03/30/2021 79  0 - 150 mg/dL Final    HDL 03/30/2021 92* 40 - 60 mg/dL Final    LDL Calculated 03/30/2021 133* <100 mg/dL Final    VLDL Cholesterol Calculated 03/30/2021 16  Not Established mg/dL Final    TSH 03/30/2021 2.11  0.27 - 4.20 uIU/mL Final    Sodium 03/30/2021 139  136 - 145 mmol/L Final    Potassium 03/30/2021 3.8  3.5 - 5.1 mmol/L Final    Chloride 03/30/2021 100  99 - 110 mmol/L Final    CO2 03/30/2021 31  21 - 32 mmol/L Final    Anion Gap 03/30/2021 8  3 - 16 Final    Glucose 03/30/2021 98  70 - 99 mg/dL Final    BUN 03/30/2021 20  7 - 20 mg/dL Final    CREATININE 03/30/2021 0.8  0.6 - 1.2 mg/dL Final    GFR Non- 03/30/2021 >60  >60 Final    Comment: >60 mL/min/1.73m2 EGFR, calc. for ages 25 and older using the  MDRD formula (not corrected for weight), is valid for stable  renal function.  GFR  03/30/2021 >60  >60 Final    Comment: Chronic Kidney Disease: less than 60 ml/min/1.73 sq.m. Kidney Failure: less than 15 ml/min/1.73 sq.m. Results valid for patients 18 years and older.  Calcium 03/30/2021 9.8  8.3 - 10.6 mg/dL Final    Hemoglobin A1C 03/30/2021 5.6  See comment % Final    Comment: Comment:  Diagnosis of Diabetes: > or = 6.5%  Increased risk of diabetes (Prediabetes): 5.7-6.4%  Glycemic Control: Nonpregnant Adults: <7.0%                    Pregnant: <6.0%        eAG 03/30/2021 114.0  mg/dL Final    Vit D, 25-Hydroxy 03/30/2021 68.3  >=30 ng/mL Final    Comment: <=20 ng/mL. ........... Cleophus Yonathan Deficient  21-29 ng/mL. ......... Cleophus Yonathan Insufficient  >=30 ng/mL. ........ Cleophus Yonathan Sufficient         Assessment:     1. Hyperlipidemia: This 77 yrs old female has h/o hyperlipidemia. She was not able to tolerate lipitor, Crestor, pravastatin   On livalo 2 mg every other day . Tolerating now. Advised can try Daily  No h/o CAD or CVA. May consider Zetia, welchol or PCKS-9 inhibitor if needed    2. Hypothyroidism: On levothyroxine, nl  TSH    3. Vit  D deficiency:  On weekly supplement, monitor. She was on twice a week prior to it, level 78    4. Imparied fasting glucose: Normal A1c. Recommend CHO restriction and weight loss    5. Beast ca. As per oncology. 6.HTN: Stable    7. Osteopenia: Follows Dr. Joy Small, also on Femara. Recommend Ca supplement 500mg daily. Dexa 7/21 shows stable BMD but osteoporosis , T score -2.6 at hip     Plan: 1. Change livalo   2. Lipid in 6 months  3. TSH in 6 months

## 2021-07-21 ENCOUNTER — TELEPHONE (OUTPATIENT)
Dept: ENDOCRINOLOGY | Age: 76
End: 2021-07-21

## 2021-07-21 NOTE — TELEPHONE ENCOUNTER
[facility] called and asked if dr. Gabo Márquez could review the dexa scan and call her with recommendations

## 2021-07-21 NOTE — TELEPHONE ENCOUNTER
Please advise patient [facility] would like us to give recommendation. I reviewed, would recommend to start fosamax once a week given osteoporosis on the scan .  If she would like to discuss mannur, can talk at the visit

## 2021-08-13 ENCOUNTER — OFFICE VISIT (OUTPATIENT)
Dept: ORTHOPEDIC SURGERY | Age: 76
End: 2021-08-13
Payer: MEDICARE

## 2021-08-13 VITALS — HEIGHT: 62 IN | WEIGHT: 146 LBS | BODY MASS INDEX: 26.87 KG/M2

## 2021-08-13 DIAGNOSIS — M20.21 HALLUX RIGIDUS OF RIGHT FOOT: Primary | ICD-10-CM

## 2021-08-13 PROCEDURE — 99203 OFFICE O/P NEW LOW 30 MIN: CPT | Performed by: ORTHOPAEDIC SURGERY

## 2021-08-13 RX ORDER — NAPROXEN 500 MG/1
500 TABLET ORAL 2 TIMES DAILY WITH MEALS
Qty: 60 TABLET | Refills: 0 | Status: SHIPPED | OUTPATIENT
Start: 2021-08-13 | End: 2022-02-02

## 2021-08-14 PROBLEM — M20.21 HALLUX RIGIDUS OF RIGHT FOOT: Status: ACTIVE | Noted: 2021-08-14

## 2021-08-14 NOTE — PROGRESS NOTES
CHIEF COMPLAINT: Right great toe pain/Hallux rigidus. HISTORY:  Ms. Holli Ramirez 77 y.o.  female presents today for consultation request from Crystal De Oliveira MD for evaluation of right great toe pain which started May 2021.  She is complaining of achy pain 1/10. Pain is increase with standing and walking and shoe wear. Pain is sharp early in the morning with first few steps, dull achy pain by the end of the day. No radiation and no numbness and tingling sensation. No other complaint. No h/o trauma or gout. Past Medical History:   Diagnosis Date    Allergic rhinitis     Cancer ([facility] [address]     lumpectomy     Diverticulitis     Diverticulitis 3/1/16    GERD (gastroesophageal reflux disease)     Hearing loss     Hyperlipidemia     Hypertension     Osteoarthritis     Pancreatic mass tail       Past Surgical History:   Procedure Laterality Date    BLADDER SUSPENSION      COLONOSCOPY  2012    polyp-ghastine    HYSTERECTOMY  2007    HYSTERECTOMY, TOTAL ABDOMINAL         Social History     Socioeconomic History    Marital status:       Spouse name: Not on file    Number of children: Not on file    Years of education: Not on file    Highest education level: Not on file   Occupational History    Not on file   Tobacco Use    Smoking status: Former Smoker     Quit date: 2001     Years since quittin.7    Smokeless tobacco: Never Used   Vaping Use    Vaping Use: Never assessed   Substance and Sexual Activity    Alcohol use: Yes     Comment: socially    Drug use: No    Sexual activity: Not Currently   Other Topics Concern    Not on file   Social History Narrative    Not on file     Social Determinants of Health     Financial Resource Strain:     Difficulty of Paying Living Expenses:    Food Insecurity: No Food Insecurity    Worried About Running Out of Food in the Last Year: Never true    Caroline of Food in the Last Year: Never true   Transportation Needs:     Lack of Transportation (Medical):  Lack of Transportation (Non-Medical):    Physical Activity:     Days of Exercise per Week:     Minutes of Exercise per Session:    Stress:     Feeling of Stress :    Social Connections:     Frequency of Communication with Friends and Family:     Frequency of Social Gatherings with Friends and Family:     Attends [employer] Services:     Active Member of Clubs or Organizations:     Attends Club or Organization Meetings:     Marital Status:    Intimate Partner Violence:     Fear of Current or Ex-Partner:     Emotionally Abused:     Physically Abused:     Sexually Abused:        Family History   Problem Relation Age of Onset    Stroke Father        Current Outpatient Medications on File Prior to Visit   Medication Sig Dispense Refill    diazePAM (VALIUM) 5 MG tablet Take 1 tablet by mouth every 8 hours as needed for Anxiety for up to 30 days. 5 tablet 0    vitamin D (ERGOCALCIFEROL) 1.25 MG (91228 UT) CAPS capsule Take 1 capsule by mouth once a week 12 capsule 3    levothyroxine (SYNTHROID) 50 MCG tablet TAKE ONE TABLET BY MOUTH DAILY 90 tablet 3    pitavastatin (LIVALO) 2 MG TABS tablet TAKE 1 TABLET EVERY  DAY. 90 tablet 3    amLODIPine (NORVASC) 5 MG tablet TAKE ONE TABLET BY MOUTH EVERY DAY 90 tablet 2    omeprazole (PRILOSEC) 20 MG delayed release capsule TAKE ONE CAPSULE BY MOUTH EVERY DAY 90 capsule 0    triamterene-hydroCHLOROthiazide (MAXZIDE-25) 37.5-25 MG per tablet TAKE ONE TABLET BY MOUTH EVERY DAY 90 tablet 0    Ascorbic Acid (VITAMIN C ADULT GUMMIES PO) Take by mouth      letrozole (FEMARA) 2.5 MG tablet Take 2.5 mg by mouth daily      Multiple Vitamins-Minerals (MULTI-VITAMIN GUMMIES PO) Take by mouth      aspirin 81 MG tablet Take 81 mg by mouth daily. No current facility-administered medications on file prior to visit.        Pertinent items are noted in HPI  Review of systems reviewed from Patient History Form dated on 8/13/2021 and available in the patient's chart under the Media tab. No change noted. PHYSICAL EXAMINATION:  Ms. Henrietta Godwin is a very pleasant 77 y.o.  female who presents today in no acute distress, awake, alert, and oriented. She is well dressed, nourished and  groomed. Patient with normal affect. Height is  5' 2\" (1.575 m), weight is 146 lb (66.2 kg), Body mass index is 26.7 kg/m². Resting respiratory rate is 16. Examination of the gait, showed that the patient walks heel-toe with a non-antalgic gait and no limp.  Examination of both ankles showing a good range of motion.  She has dorsiflexion to about 10 degrees bilaterally, which increased with knee flexion. She has intact sensation and good pedal pulses.  She has good strength in all four planes, including eversion, and has mild tenderness on deep palpation over the right great toe MPJ, with prominent dorsal osteophytes and limited ROM.  The ankles are stable to drawer test bilaterally, equally.  Ankle reflex 1+ bilaterally. IMAGING:  Mally Drop were reviewed, 3 views of the right foot taken in office today, and showed no acute fracture. Hallux rigidus with arthritis and dorsal osteophytes. IMPRESSION: Right Hallux rigidus. PLAN: I discussed with the patient the treatment options including both surgical and non-surgical treatment. We recommended stretching exercises of the calf which was taught to the patient today. She will take NSAIDS Naprosyn. Use stiff sole shoes. I believe she may benefit from cortisone injection right great toe, if not better. F/u in 6 weeks, PT if needed. She understands that this may need surgery if the pain did not to resolve. Thank you very much for the kind consultation and allowing me to participate in this patient's care. I will continue to keep you apprised of her progress.         Sourav Bernard MD

## 2021-09-02 ENCOUNTER — HOSPITAL ENCOUNTER (OUTPATIENT)
Dept: MRI IMAGING | Age: 76
Discharge: HOME | End: 2021-09-02
Payer: MEDICARE

## 2021-09-02 DIAGNOSIS — K86.2 CYST AND PSEUDOCYST OF PANCREAS: ICD-10-CM

## 2021-09-02 DIAGNOSIS — K86.3 CYST AND PSEUDOCYST OF PANCREAS: ICD-10-CM

## 2021-09-02 PROCEDURE — 6360000004 HC RX CONTRAST MEDICATION: Performed by: INTERNAL MEDICINE

## 2021-09-02 PROCEDURE — A9577 INJ MULTIHANCE: HCPCS | Performed by: INTERNAL MEDICINE

## 2021-09-02 PROCEDURE — 74183 MRI ABD W/O CNTR FLWD CNTR: CPT

## 2021-09-02 RX ADMIN — GADOBENATE DIMEGLUMINE 13 ML: 529 INJECTION, SOLUTION INTRAVENOUS at 11:34

## 2021-10-05 ENCOUNTER — OFFICE VISIT (OUTPATIENT)
Dept: ENDOCRINOLOGY | Age: 76
End: 2021-10-05
Payer: MEDICARE

## 2021-10-05 VITALS
HEART RATE: 84 BPM | SYSTOLIC BLOOD PRESSURE: 124 MMHG | WEIGHT: 146 LBS | DIASTOLIC BLOOD PRESSURE: 65 MMHG | HEIGHT: 62 IN | BODY MASS INDEX: 26.87 KG/M2

## 2021-10-05 DIAGNOSIS — M81.0 AGE-RELATED OSTEOPOROSIS WITHOUT CURRENT PATHOLOGICAL FRACTURE: Primary | ICD-10-CM

## 2021-10-05 DIAGNOSIS — E78.49 OTHER HYPERLIPIDEMIA: ICD-10-CM

## 2021-10-05 PROCEDURE — 99214 OFFICE O/P EST MOD 30 MIN: CPT | Performed by: INTERNAL MEDICINE

## 2021-10-05 RX ORDER — ALENDRONATE SODIUM 70 MG/1
70 TABLET ORAL
Qty: 13 TABLET | Refills: 3 | Status: SHIPPED | OUTPATIENT
Start: 2021-10-05

## 2021-10-05 NOTE — PROGRESS NOTES
This 77 yrs old female is here for management of hyperlipidemia, vit D def, hypothyroidism, impaired fasting    She was seeing Dr. Vikash Morales    Interim:    Seen for osteoporosis  Inquires if fosamax would effect bone spur  Now taking livalo 6 days a week, tolerating    Hyperlipidemia :   She has h/o hypercholesterolemia for years    She took lipitor 40mg then started experiencing myalgias in upper legs, especially at night. Then took Crestor 10mg which reproduced the symptoms. She was started on vit D  twice a week and was given crestor 20 mg daily with it. She tolerated it initially but then started having muscle pains and stopped it. Could not tolerate Pravastatin in 02/18     She is currently on Livalo 2 mg every other day. She is able to tolerate Livalo. No significant muscle aches     Mild, controlled, no worsening factors    ----> 154--->90---> 117---> 115---> 158---> 114---> 127--> 138  --> 114--> 95---> 85---> 102---> 91--> 95  TGD 61---> 88---> 87----> 124---> 100---> 102---> 116  TC  337--->  227---> 268---> 236--> 238 ---> 256    She does exercise    1/21       7/21       Hypothyroidism : She has hypothyroidism . On levothyroxine 50 mcg daily. For years, stable    On levothyroxine 50 mg daily. TSH 3.15 ---> 3.46---> 4.46---> 2.65---> 2.45    Vitamin D def : She is on vitamin D 50,000 IU weekly. She was on 50,000 IU twice a week. Dose was adjusted as her levels were high normal.     Vitamin D 85.1---> 62.8---> 73.5---> 68---> 78.1      Impaired fasting glucose :  Glucose 104---> 100---> 103---> 101---> 101---> 92---> 89  She has h/o impaired fasting glucose  No significant FH of DM    Weight has been stable. She is on diet management.      She has osteopenia, follows with Dr. Kimani Youngblood    Records from Dr. Raheel Feng and Dr. Vikash Morales,  labs reviewed    She has osteopenia, followed by Dr. Kimani Youngblood, they would like us to follow    Diet Ca 600mg   She has HTN, on norvasc and triamterene/HCTZ  Stable    7/21  LUMBAR SPINE:       The bone mineral density in the lumbar spine including the L1-L4 levels is   measured at 0.98 g/cm2, which corresponds to a T-score of -0.6 and a Z-score   of 1.8.  This is within the normal range by WHO criteria.       No great change from the comparison       RIGHT HIP:       The bone mineral density in the total hip is measured at 0.77 g/cm2   corresponding to a T-score of -1.4 and a Z-score of 0.4.  This is within the   osteopenia range by WHO criteria.       The bone mineral density of the femoral neck is measured at 0.56 g/cm2   corresponding to a T-score of -2.6 and a Z-score of -0.5.  This is within the   osteoporosis range by WHO criteria. Not on treatment    No previous fracture    FH: Dad 68 years had CVA  Not sure of hyperlipidemia  No siblings    SH: Quit smoking 2002  31 ys 5 cig/day no drugs    Past Medical History:   Diagnosis Date    Allergic rhinitis     Cancer ([facility] [address]     lumpectomy 11-18    Diverticulitis     Diverticulitis 3/1/16    GERD (gastroesophageal reflux disease)     Hearing loss     Hyperlipidemia     Hypertension     Osteoarthritis     Pancreatic mass tail     Past Surgical History:   Procedure Laterality Date    BLADDER SUSPENSION  2007    COLONOSCOPY  1/2012    polyp-ghastine    HYSTERECTOMY  2007    HYSTERECTOMY, TOTAL ABDOMINAL         Review of Systems   Scanned, reviewed    There were no vitals filed for this visit. There were no vitals filed for this visit. Constitutional: Well-developed, appears stated age, cooperative, in no acute distress  H/E/N/M/T:atraumatic, normocephalic, external ears, nose, lips normal without lesions  Eyes: Lids, lashes, conjunctivae and sclerae normal, No proptosis, no redness  Neck: supple, symmetrical, no swelling  Skin: No obvious rashes or lesions present.   Skin and hair texture normal  Psychiatric: Judgement and Insight:  judgement and insight appear normal  Neuro: Normal without focal findings, speech is normal normal, speech is spontaneous  Chest: No labored breathing, no chest deformity, no stridor  Musculoskeletal: No joint deformity, swelling      Orders Only on 07/13/2021   Component Date Value Ref Range Status    Vit D, 25-Hydroxy 07/13/2021 78.1  >=30 ng/mL Final    Comment: <=20 ng/mL. ........... Margkerentte Castano Deficient  21-29 ng/mL. ......... Margurette Castano Insufficient  >=30 ng/mL. ........ Margurette Castano Sufficient      Hemoglobin A1C 07/13/2021 5.6  See comment % Final    Comment: Comment:  Diagnosis of Diabetes: > or = 6.5%  Increased risk of diabetes (Prediabetes): 5.7-6.4%  Glycemic Control: Nonpregnant Adults: <7.0%                    Pregnant: <6.0%        eAG 07/13/2021 114.0  mg/dL Final    Sodium 07/13/2021 138  136 - 145 mmol/L Final    Potassium 07/13/2021 3.9  3.5 - 5.1 mmol/L Final    Chloride 07/13/2021 98* 99 - 110 mmol/L Final    CO2 07/13/2021 27  21 - 32 mmol/L Final    Anion Gap 07/13/2021 13  3 - 16 Final    Glucose 07/13/2021 89  70 - 99 mg/dL Final    BUN 07/13/2021 15  7 - 20 mg/dL Final    CREATININE 07/13/2021 0.7  0.6 - 1.2 mg/dL Final    GFR Non- 07/13/2021 >60  >60 Final    Comment: >60 mL/min/1.73m2 EGFR, calc. for ages 25 and older using the  MDRD formula (not corrected for weight), is valid for stable  renal function.  GFR  07/13/2021 >60  >60 Final    Comment: Chronic Kidney Disease: less than 60 ml/min/1.73 sq.m. Kidney Failure: less than 15 ml/min/1.73 sq.m. Results valid for patients 18 years and older.       Calcium 07/13/2021 9.4  8.3 - 10.6 mg/dL Final    TSH 07/13/2021 2.78  0.27 - 4.20 uIU/mL Final    Cholesterol, Total 07/13/2021 261* 0 - 199 mg/dL Final    Triglycerides 07/13/2021 86  0 - 150 mg/dL Final    HDL 07/13/2021 108* 40 - 60 mg/dL Final    LDL Calculated 07/13/2021 136* <100 mg/dL Final    VLDL Cholesterol Calculated 07/13/2021 17  Not Established mg/dL Final Assessment:     1. Hyperlipidemia: This 77 yrs old female has h/o hyperlipidemia. She was not able to tolerate lipitor, Crestor, pravastatin   On livalo 2 mg 6 day/ week . Tolerating now. Advised can try Daily  No h/o CAD or CVA. May consider Zetia, welchol or PCKS-9 inhibitor if needed    2. Hypothyroidism: On levothyroxine, nl  TSH    3. Vit  D deficiency: On weekly supplement, monitor. She was on twice a week prior to it, level 78    4. Imparied fasting glucose: Normal A1c. Recommend CHO restriction and weight loss    5. Beast ca. As per oncology. 6.HTN: Stable    7. Osteopenia: Follows Dr. Cruz Collins, also on Femara. Recommend Ca supplement 500mg daily. Dexa 7/21 shows stable BMD but osteoporosis , T score -2.6 at hip. Discussed Tx with fosamax, risk of side effects. Atypical femoral fracture and ONJ, she is willing to start     Plan:     1.livalo  daily  2. Lipid in 6 months  3. TSH in 6 months  4. Start fosamax

## 2022-01-17 DIAGNOSIS — E03.9 ACQUIRED HYPOTHYROIDISM: ICD-10-CM

## 2022-01-17 DIAGNOSIS — E55.9 VITAMIN D DEFICIENCY: ICD-10-CM

## 2022-01-17 DIAGNOSIS — E78.00 PURE HYPERCHOLESTEROLEMIA: ICD-10-CM

## 2022-01-17 LAB
CHOLESTEROL, TOTAL: 258 MG/DL (ref 0–199)
HDLC SERPL-MCNC: 96 MG/DL (ref 40–60)
LDL CHOLESTEROL CALCULATED: 131 MG/DL
TRIGL SERPL-MCNC: 155 MG/DL (ref 0–150)
TSH REFLEX: 2.75 UIU/ML (ref 0.27–4.2)
VITAMIN D 25-HYDROXY: 62.7 NG/ML
VLDLC SERPL CALC-MCNC: 31 MG/DL

## 2022-01-20 ENCOUNTER — OFFICE VISIT (OUTPATIENT)
Dept: ENDOCRINOLOGY | Age: 77
End: 2022-01-20
Payer: MEDICARE

## 2022-01-20 VITALS
HEIGHT: 62 IN | SYSTOLIC BLOOD PRESSURE: 126 MMHG | WEIGHT: 148.4 LBS | DIASTOLIC BLOOD PRESSURE: 70 MMHG | HEART RATE: 76 BPM | OXYGEN SATURATION: 98 % | BODY MASS INDEX: 27.31 KG/M2

## 2022-01-20 DIAGNOSIS — M81.0 AGE-RELATED OSTEOPOROSIS WITHOUT CURRENT PATHOLOGICAL FRACTURE: Primary | ICD-10-CM

## 2022-01-20 DIAGNOSIS — E03.9 ACQUIRED HYPOTHYROIDISM: ICD-10-CM

## 2022-01-20 DIAGNOSIS — E55.9 VITAMIN D DEFICIENCY: ICD-10-CM

## 2022-01-20 DIAGNOSIS — E78.00 PURE HYPERCHOLESTEROLEMIA: ICD-10-CM

## 2022-01-20 PROCEDURE — 99214 OFFICE O/P EST MOD 30 MIN: CPT | Performed by: INTERNAL MEDICINE

## 2022-01-20 RX ORDER — LEVOTHYROXINE SODIUM 0.05 MG/1
TABLET ORAL
Qty: 90 TABLET | Refills: 3 | Status: SHIPPED | OUTPATIENT
Start: 2022-01-20

## 2022-01-20 RX ORDER — ERGOCALCIFEROL 1.25 MG/1
CAPSULE ORAL
Qty: 7 CAPSULE | Refills: 3 | Status: SHIPPED | OUTPATIENT
Start: 2022-01-20 | End: 2022-07-28

## 2022-01-20 RX ORDER — PITAVASTATIN CALCIUM 2.09 MG/1
TABLET, FILM COATED ORAL
Qty: 90 TABLET | Refills: 3 | Status: SHIPPED | OUTPATIENT
Start: 2022-01-20

## 2022-01-20 NOTE — PROGRESS NOTES
This 77 yrs old female is here for management of hyperlipidemia, vit D def, hypothyroidism, impaired fasting    She was seeing Dr. Kevin Mckay    Interim:    Seen for osteoporosis  Inquires if fosamax would effect bone spur  Now taking livalo 7 days a week, tolerating  She was holding the levothyroxine the day when takes fosamax    Hyperlipidemia :   She has h/o hypercholesterolemia for years    She took lipitor 40mg then started experiencing myalgias in upper legs, especially at night. Then took Crestor 10mg which reproduced the symptoms. She was started on vit D  twice a week and was given crestor 20 mg daily with it. She tolerated it initially but then started having muscle pains and stopped it. Could not tolerate Pravastatin in 02/18     She is currently on Livalo 2 mg every other day. She is able to tolerate Livalo. No significant muscle aches     Mild, controlled, no worsening factors    ----> 154--->90---> 117---> 115---> 158---> 114---> 127--> 138  --> 114--> 95---> 85---> 102---> 91--> 95  TGD 61---> 88---> 87----> 124---> 100---> 102---> 116  TC  337--->  227---> 268---> 236--> 238 ---> 256    She does exercise    1/21       7/21     1/22      Hypothyroidism : She has hypothyroidism . On levothyroxine 50 mcg daily. For years, stable    On levothyroxine 50 mg daily. TSH 3.15 ---> 3.46---> 4.46---> 2.65---> 2.45---> 2.75    Vitamin D def : She is on vitamin D 50,000 IU weekly. She was on 50,000 IU twice a week. Dose was adjusted as her levels were high normal.     Vitamin D 85.1---> 62.8---> 73.5---> 68---> 78.1---> 62      Impaired fasting glucose :  Glucose 104---> 100---> 103---> 101---> 101---> 92---> 89  She has h/o impaired fasting glucose  No significant FH of DM    Weight has been stable. She is on diet management.      She has osteopenia, follows with Dr. Riley Brewster    Records from Dr. Erinn Longoria and Dr. Kevin Mckay,  labs reviewed    She has osteopenia, followed by Dr. Esequiel Verdin, they would like us to follow    Diet Ca 600mg   She has HTN, on norvasc and triamterene/HCTZ  Stable    7/21  LUMBAR SPINE:       The bone mineral density in the lumbar spine including the L1-L4 levels is   measured at 0.98 g/cm2, which corresponds to a T-score of -0.6 and a Z-score   of 1.8.  This is within the normal range by WHO criteria.       No great change from the comparison       RIGHT HIP:       The bone mineral density in the total hip is measured at 0.77 g/cm2   corresponding to a T-score of -1.4 and a Z-score of 0.4.  This is within the   osteopenia range by [facility] criteria.       The bone mineral density of the femoral neck is measured at 0.56 g/cm2   corresponding to a T-score of -2.6 and a Z-score of -0.5.  This is within the   osteoporosis range by WHO criteria.      Not on treatment    No previous fracture    Fosamax started 10.21    FH: Dad 68 years had CVA  Not sure of hyperlipidemia  No siblings    SH: Quit smoking 2002  31 ys 5 cig/day no drugs    Past Medical History:   Diagnosis Date    Allergic rhinitis     Cancer ([facility] [address]     lumpectomy 11-18    Diverticulitis     Diverticulitis 3/1/16    GERD (gastroesophageal reflux disease)     Hearing loss     Hyperlipidemia     Hypertension     Osteoarthritis     Pancreatic mass tail     Past Surgical History:   Procedure Laterality Date    BLADDER SUSPENSION  2007    COLONOSCOPY  1/2012    polyp-ghastine    HYSTERECTOMY  2007    HYSTERECTOMY, TOTAL ABDOMINAL         Review of Systems   Scanned, reviewed    Vitals:    01/20/22 1038   BP: 126/70   Pulse: 76   SpO2: 98%       Vitals:    01/20/22 1038   BP: 126/70   Pulse: 76   SpO2: 98%       Constitutional: Well-developed, appears stated age, cooperative, in no acute distress  H/E/N/M/T:atraumatic, normocephalic, external ears, nose, lips normal without lesions  Eyes: Lids, lashes, conjunctivae and sclerae normal, No proptosis, no redness  Neck: supple, symmetrical, no swelling  Skin: No obvious rashes or lesions present. Skin and hair texture normal  Psychiatric: Judgement and Insight:  judgement and insight appear normal  Neuro: Normal without focal findings, speech is normal normal, speech is spontaneous  Chest: No labored breathing, no chest deformity, no stridor  Musculoskeletal: No joint deformity, swelling      Orders Only on 01/17/2022   Component Date Value Ref Range Status    Vit D, 25-Hydroxy 01/17/2022 62.7  >=30 ng/mL Final    Comment: <=20 ng/mL. ........... Alvarez Skates Deficient  21-29 ng/mL. ......... Alvarez Skates Insufficient  >=30 ng/mL. ........ Alvarez Skates Sufficient      TSH 01/17/2022 2.75  0.27 - 4.20 uIU/mL Final    Cholesterol, Total 01/17/2022 258* 0 - 199 mg/dL Final    Triglycerides 01/17/2022 155* 0 - 150 mg/dL Final    HDL 01/17/2022 96* 40 - 60 mg/dL Final    LDL Calculated 01/17/2022 131* <100 mg/dL Final    VLDL Cholesterol Calculated 01/17/2022 31  Not Established mg/dL Final       Assessment:     1. Hyperlipidemia: This 77 yrs old female has h/o hyperlipidemia. She was not able to tolerate lipitor, Crestor, pravastatin   On livalo 2 mg 6 day/ week . Tolerating now. Advised can try Daily  No h/o CAD or CVA. May consider Zetia, welchol or PCKS-9 inhibitor if needed  Assess next visit    2. Hypothyroidism: On levothyroxine, nl  TSH. Advised to take every day    3. Vit  D deficiency: On weekly supplement, monitor. She was on twice a week prior to it, level 78---> 68  Change to every 2 weeks    4. Imparied fasting glucose: Normal A1c. Recommend CHO restriction and weight loss    5. Beast ca. As per oncology. 6.HTN: Stable    7. Osteopenia: Follows Dr. Dayan Carrera, also on Femara. Recommend Ca supplement 500mg daily. Dexa 7/21 shows stable BMD but osteoporosis , T score -2.6 at hip. Discussed Tx with fosamax, risk of side effects.  Atypical femoral fracture and ONJ, she is willing to start  On it since 10.21     Plan:     1.livalo daily  2. Lipid in 6 months  3. TSH in 6 months  4. Fosamax  4. Start fosamax

## 2022-01-27 ENCOUNTER — HOSPITAL ENCOUNTER (OUTPATIENT)
Dept: WOMENS IMAGING | Age: 77
Discharge: HOME | End: 2022-01-27
Payer: MEDICARE

## 2022-01-27 DIAGNOSIS — Z12.31 BREAST CANCER SCREENING BY MAMMOGRAM: ICD-10-CM

## 2022-01-27 PROCEDURE — 77063 BREAST TOMOSYNTHESIS BI: CPT

## 2022-02-02 ENCOUNTER — APPOINTMENT (OUTPATIENT)
Dept: GENERAL RADIOLOGY | Age: 77
End: 2022-02-02
Payer: MEDICARE

## 2022-02-02 ENCOUNTER — HOSPITAL ENCOUNTER (OUTPATIENT)
Age: 77
Setting detail: OBSERVATION
Discharge: HOME | End: 2022-02-03
Attending: EMERGENCY MEDICINE | Admitting: INTERNAL MEDICINE
Payer: MEDICARE

## 2022-02-02 DIAGNOSIS — I44.7 NEW ONSET LEFT BUNDLE BRANCH BLOCK (LBBB): ICD-10-CM

## 2022-02-02 DIAGNOSIS — R07.9 CHEST PAIN, UNSPECIFIED TYPE: Primary | ICD-10-CM

## 2022-02-02 LAB
ANION GAP SERPL CALCULATED.3IONS-SCNC: 14 MMOL/L (ref 3–16)
BASOPHILS ABSOLUTE: 0.1 K/UL (ref 0–0.2)
BASOPHILS RELATIVE PERCENT: 0.6 %
BUN BLDV-MCNC: 19 MG/DL (ref 7–20)
CALCIUM SERPL-MCNC: 10.1 MG/DL (ref 8.3–10.6)
CHLORIDE BLD-SCNC: 98 MMOL/L (ref 99–110)
CO2: 25 MMOL/L (ref 21–32)
CREAT SERPL-MCNC: 0.6 MG/DL (ref 0.6–1.2)
EOSINOPHILS ABSOLUTE: 0 K/UL (ref 0–0.6)
EOSINOPHILS RELATIVE PERCENT: 0.1 %
GFR AFRICAN AMERICAN: >60
GFR NON-AFRICAN AMERICAN: >60
GLUCOSE BLD-MCNC: 127 MG/DL (ref 70–99)
HCT VFR BLD CALC: 42 % (ref 36–48)
HEMOGLOBIN: 14.5 G/DL (ref 12–16)
INR BLD: 0.94 (ref 0.88–1.12)
LYMPHOCYTES ABSOLUTE: 1.1 K/UL (ref 1–5.1)
LYMPHOCYTES RELATIVE PERCENT: 11.6 %
MAGNESIUM: 2.1 MG/DL (ref 1.8–2.4)
MCH RBC QN AUTO: 31.9 PG (ref 26–34)
MCHC RBC AUTO-ENTMCNC: 34.6 G/DL (ref 31–36)
MCV RBC AUTO: 92.2 FL (ref 80–100)
MONOCYTES ABSOLUTE: 0.4 K/UL (ref 0–1.3)
MONOCYTES RELATIVE PERCENT: 4.2 %
NEUTROPHILS ABSOLUTE: 8.1 K/UL (ref 1.7–7.7)
NEUTROPHILS RELATIVE PERCENT: 83.5 %
PDW BLD-RTO: 12.6 % (ref 12.4–15.4)
PLATELET # BLD: 325 K/UL (ref 135–450)
PMV BLD AUTO: 6.9 FL (ref 5–10.5)
POTASSIUM REFLEX MAGNESIUM: 3.3 MMOL/L (ref 3.5–5.1)
PROTHROMBIN TIME: 10.6 SEC (ref 9.9–12.7)
RBC # BLD: 4.56 M/UL (ref 4–5.2)
SODIUM BLD-SCNC: 137 MMOL/L (ref 136–145)
TROPONIN: <0.01 NG/ML
WBC # BLD: 9.7 K/UL (ref 4–11)

## 2022-02-02 PROCEDURE — 83735 ASSAY OF MAGNESIUM: CPT

## 2022-02-02 PROCEDURE — 93005 ELECTROCARDIOGRAM TRACING: CPT | Performed by: EMERGENCY MEDICINE

## 2022-02-02 PROCEDURE — 71046 X-RAY EXAM CHEST 2 VIEWS: CPT

## 2022-02-02 PROCEDURE — 80048 BASIC METABOLIC PNL TOTAL CA: CPT

## 2022-02-02 PROCEDURE — 36415 COLL VENOUS BLD VENIPUNCTURE: CPT

## 2022-02-02 PROCEDURE — 85025 COMPLETE CBC W/AUTO DIFF WBC: CPT

## 2022-02-02 PROCEDURE — 99284 EMERGENCY DEPT VISIT MOD MDM: CPT

## 2022-02-02 PROCEDURE — 84484 ASSAY OF TROPONIN QUANT: CPT

## 2022-02-02 PROCEDURE — 85610 PROTHROMBIN TIME: CPT

## 2022-02-02 RX ORDER — M-VIT,TX,IRON,MINS/CALC/FOLIC 27MG-0.4MG
1 TABLET ORAL DAILY
COMMUNITY

## 2022-02-02 RX ORDER — ASCORBIC ACID 500 MG
500 TABLET ORAL DAILY
COMMUNITY
End: 2022-10-28 | Stop reason: CLARIF

## 2022-02-02 ASSESSMENT — ENCOUNTER SYMPTOMS
ALLERGIC/IMMUNOLOGIC NEGATIVE: 1
DIARRHEA: 0
RESPIRATORY NEGATIVE: 1
EYES NEGATIVE: 1
SHORTNESS OF BREATH: 0
ABDOMINAL PAIN: 0
NAUSEA: 1

## 2022-02-02 ASSESSMENT — HEART SCORE: ECG: 1

## 2022-02-02 NOTE — DISCHARGE INSTR - COC
Continuity of Care Form    Patient Name: Salas Eldridge   :  1945  MRN:  [id number]    Admit date:  2022  Discharge date:  ***    Code Status Order: Prior   Advance Directives:      Admitting Physician:  No admitting provider for patient encounter.   PCP: Berhane Reed MD    Discharging Nurse: [facility] Unit/Room#: 075/A-38  Discharging Unit Phone Number: ***    Emergency Contact:   Extended Emergency Contact Information  Primary Emergency Contact: Maci Choe [address] Phone: [phone]  Relation: Child  Secondary Emergency Contact: Vasyl Jamil  [place] Phone: [phone]  Relation: Other    Past Surgical History:  Past Surgical History:   Procedure Laterality Date    BLADDER SUSPENSION      COLONOSCOPY  2012    polyp-ghastine    HYSTERECTOMY  2007    HYSTERECTOMY, TOTAL ABDOMINAL         Immunization History:   Immunization History   Administered Date(s) Administered    COVID-19, Graciella Belling, Primary or Immunocompromised, PF, 100mcg/0.5mL 2021, 2021, 2021    Influenza 2011, 10/18/2012    Influenza Virus Vaccine 10/03/2014    Influenza, High Dose (Fluzone 72 yrs and older) 10/17/2017    Influenza, Triv, 3 Years and older, IM (Afluria (5 yrs and older) 10/29/2019    Influenza, Triv, inactivated, subunit, adjuvanted, IM (Fluad 65 yrs and older) 2020    Pneumococcal Conjugate 13-valent ([id number]) 2015    Pneumococcal Polysaccharide (Neaufgmko33) 2011    Td, unspecified formulation 2012    Zoster Recombinant (Shingrix) 2019, 2019       Active Problems:  Patient Active Problem List   Diagnosis Code    Hyperlipidemia E78.5    HTN (hypertension) I10    GERD (gastroesophageal reflux disease) K21.9    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3 kg)   SpO2 98%   BMI 27.14 kg/m²     Last documented pain score (0-10 scale):    Last Weight:   Wt Readings from Last 1 Encounters:   02/02/22 148 lb 5.9 oz (67.3 kg)     Mental Status:  {IP PT MENTAL STATUS:20030}    IV Access:  { DEJAH IV ACCESS:572669924}    Nursing Mobility/ADLs:  Walking   {CHP DME TAFE:277205572}  Transfer  {CHP DME WTCO:355950669}  Bathing  {P DME PLFU:616199704}  Dressing  {CHP DME FCDS:322131933}  Toileting  {CHP DME SJIQ:624941453}  Feeding  {P DME FFST:864931830}  Med Admin  {P DME LRLR:838701143}  Med Delivery   { DEJAH MED Delivery:701262779}    Wound Care Documentation and Therapy:        Elimination:  Continence: Bowel: {YES / WB:14485}  Bladder: {YES / TI:29593}  Urinary Catheter: {Urinary Catheter:892180412}   Colostomy/Ileostomy/Ileal Conduit: {YES / WS:88446}       Date of Last BM: ***  No intake or output data in the 24 hours ending 02/02/22 1817  No intake/output data recorded.     Safety Concerns:     [address] Safety Concerns:102835025}    Impairments/Disabilities:      [address] Impairments/Disabilities:803626824}    Nutrition Therapy:  Current Nutrition Therapy:   [address] Diet List:703249325}    Routes of Feeding: {CHP DME Other Feedings:409854615}  Liquids: {Slp liquid thickness:73494}  Daily Fluid Restriction: {CHP DME Yes amt example:377738402}  Last Modified Barium Swallow with Video (Video Swallowing Test): {Done Not Done YGYD:300879489}    Treatments at the Time of Hospital Discharge:   Respiratory Treatments: ***  Oxygen Therapy:  {Therapy; copd oxygen:14810}  Ventilator:    { CC Vent GAXR:174742046}    Rehab Therapies: {THERAPEUTIC INTERVENTION:7010405355}  Weight Bearing Status/Restrictions: { CC Weight Bearin}  Other Medical Equipment (for information only, NOT a DME order):  {EQUIPMENT:429930953}  Other Treatments: ***    Patient's personal belongings (please select all that are sent with patient):  {CHP DME Belongings:249814085}    RN SIGNATURE:  {Esignature:091405411}    CASE MANAGEMENT/SOCIAL WORK SECTION    Inpatient Status Date: ***    Readmission Risk Assessment Score:  Readmission Risk              Risk of Unplanned Readmission:  0           Discharging to Facility/ Agency   Name:   Address:  Phone:  Fax:    Dialysis Facility (if applicable)   Name:  Address:  Dialysis Schedule:  Phone:  Fax:    / signature: {Esignature:199752287}    PHYSICIAN SECTION    Prognosis: {Prognosis:1937884490}    Condition at Discharge: [address] [place] [address] Patient Condition:074906063}    Rehab Potential (if transferring to Rehab): {Prognosis:6858216231}    Recommended Labs or Other Treatments After Discharge: ***    Physician Certification: I certify the above information and transfer of Bhargavi Luu  is necessary for the continuing treatment of the diagnosis listed and that she requires {Admit to Appropriate Level of Care:60756} for {GREATER/LESS:095726845} 30 days.      Update Admission H&P: {CHP DME Changes in INIMD:408615738}    PHYSICIAN SIGNATURE:  {Esignature:832755631}

## 2022-02-02 NOTE — ED PROVIDER NOTES
[address] [facility]      Pt Name: Js Eckert  MRN: [id number]  [place] 1945  Date of evaluation: 2/2/2022  Provider: Veronica Stein MD    CHIEF COMPLAINT       Chief Complaint   Patient presents with    Nausea     Pt sent from physicians office. Pt states she was feling naseated and was seen at her physicians office. An EKG was done that showed a new LBB. Pt sent over for labs. HISTORY OF PRESENT ILLNESS   (Location/Symptom, Timing/Onset, Context/Setting, Quality, Duration, Modifying Factors, Severity)  Note limiting factors. Js Eckert is a 77 y.o. female who presents to the emergency department with chest pain    Patient presents to the emergency department complaining of her heart feeling funny. She states she feels like her heart is beating may be faster differently than normal.  She is not having any chest pain but she states her chest does not feel correct. All the symptoms started this morning. The patient was seen by her primary care doctor who did an EKG and it was found to have a left bundle branch block which was new for her. The patient had nausea this morning but does not have any nausea now. She has never had any shortness of breath she has never had any radiation of the pain to her neck jaw or arm. Patient denies any abdominal pain. Patient does have multiple risk factors for cardiac including hypertension high cholesterol and age. The patient has no other complaints or problems          Nursing Notes were reviewed. REVIEW OF SYSTEMS    (2-9 systems for level 4, 10 or more for level 5)     Review of Systems   Constitutional: Negative. HENT: Negative. Eyes: Negative. Respiratory: Negative. Negative for shortness of breath. Cardiovascular: Positive for chest pain. Gastrointestinal: Positive for nausea. Negative for abdominal pain and diarrhea. Endocrine: Negative.     Genitourinary: Negative. Musculoskeletal: Negative. Allergic/Immunologic: Negative. Neurological: Negative. Hematological: Negative. Psychiatric/Behavioral: Negative. Except as noted above the remainder of the review of systems was reviewed and negative. PAST MEDICAL HISTORY     Past Medical History:   Diagnosis Date    Allergic rhinitis     Cancer ([facility] [address]     lumpectomy 11-18    Diverticulitis     Diverticulitis 3/1/16    GERD (gastroesophageal reflux disease)     Hearing loss     Hyperlipidemia     Hypertension     Osteoarthritis     Pancreatic mass tail         SURGICAL HISTORY       Past Surgical History:   Procedure Laterality Date    BLADDER SUSPENSION  2007    COLONOSCOPY  1/2012    polyp-ghastine    HYSTERECTOMY  2007    HYSTERECTOMY, TOTAL ABDOMINAL           CURRENT MEDICATIONS       Previous Medications    ALENDRONATE (FOSAMAX) 70 MG TABLET    Take 1 tablet by mouth every 7 days Take once per week in the morning with a full glass of water, on an empty stomach, and do not take anything else by mouth or lie down for the next 30 minutes. AMLODIPINE (NORVASC) 5 MG TABLET    TAKE ONE TABLET BY MOUTH EVERY DAY    ASCORBIC ACID (VITAMIN C ADULT GUMMIES PO)    Take by mouth    ASPIRIN 81 MG TABLET    Take 81 mg by mouth daily. LETROZOLE (FEMARA) 2.5 MG TABLET    Take 2.5 mg by mouth daily    LEVOTHYROXINE (SYNTHROID) 50 MCG TABLET    TAKE ONE TABLET BY MOUTH DAILY    MULTIPLE VITAMINS-MINERALS (MULTI-VITAMIN GUMMIES PO)    Take by mouth    OMEPRAZOLE (PRILOSEC) 20 MG DELAYED RELEASE CAPSULE    TAKE ONE CAPSULE BY MOUTH EVERY DAY    PITAVASTATIN (LIVALO) 2 MG TABS TABLET    TAKE 1 TABLET EVERY  DAY.     TRIAMTERENE-HYDROCHLOROTHIAZIDE (MAXZIDE-25) 37.5-25 MG PER TABLET    TAKE ONE TABLET BY MOUTH EVERY DAY    VITAMIN D (ERGOCALCIFEROL) 1.25 MG (36443 UT) CAPS CAPSULE    Once caps every 2 weeks       ALLERGIES     Azithromycin, Crestor [rosuvastatin], Lipitor [atorvastatin], and Pravastatin    FAMILY HISTORY       Family History   Problem Relation Age of Onset    Stroke Father           SOCIAL HISTORY       Social History     Socioeconomic History    Marital status:      Spouse name: None    Number of children: None    Years of education: None    Highest education level: None   Occupational History    None   Tobacco Use    Smoking status: Former Smoker     Quit date: 2001     Years since quittin.2    Smokeless tobacco: Never Used   Vaping Use    Vaping Use: None   Substance and Sexual Activity    Alcohol use: Yes     Comment: socially    Drug use: No    Sexual activity: Not Currently   Other Topics Concern    None   Social History Narrative    None     Social Determinants of Health     Financial Resource Strain:     Difficulty of Paying Living Expenses: Not on file   Food Insecurity: No Food Insecurity    Worried About Running Out of Food in the Last Year: Never true    Caroline of Food in the Last Year: Never true   Transportation Needs:     Lack of Transportation (Medical): Not on file    Lack of Transportation (Non-Medical):  Not on file   Physical Activity:     Days of Exercise per Week: Not on file    Minutes of Exercise per Session: Not on file   Stress:     Feeling of Stress : Not on file   Social Connections:     Frequency of Communication with Friends and Family: Not on file    Frequency of Social Gatherings with Friends and Family: Not on file    Attends Shinto Services: Not on file    Active Member of Clubs or Organizations: Not on file    Attends Club or Organization Meetings: Not on file    Marital Status: Not on file   Intimate Partner Violence:     Fear of Current or Ex-Partner: Not on file    Emotionally Abused: Not on file    Physically Abused: Not on file    Sexually Abused: Not on file   Housing Stability:     Unable to Pay for Housing in the Last Year: Not on file    Number of [place] in the Last Year: Not on file  Unstable Housing in the Last Year: Not on file       SCREENINGS         [place] Coma Scale  Eye Opening: Spontaneous  Best Verbal Response: Oriented  Best Motor Response: Obeys commands  [place] Coma Scale Score: 15     Heart Score for chest pain patients  History: Moderately Suspicious  ECG: Non-Specifc repolarization disturbance/LBTB/PM  Patient Age: > 65 years  *Risk factors for Atherosclerotic disease: Hypercholesterolemia,Hypertension  Risk Factors: 1 or 2 risk factors  Troponin: < 1X normal limit  Heart Score Total: 5               CIWA Assessment  BP: (!) 159/91  Pulse: 105                 PHYSICAL EXAM    (up to 7 for level 4, 8 or more for level 5)     ED Triage Vitals [02/02/22 1544]   BP Temp Temp Source Pulse Resp SpO2 Height Weight   (!) 159/91 98.5 °F (36.9 °C) Oral 105 17 98 % 5' 2\" (1.575 m) 148 lb 5.9 oz (67.3 kg)       Physical Exam  Constitutional:       Appearance: Normal appearance. She is normal weight. HENT:      Head: Normocephalic and atraumatic. Nose: Nose normal.      Mouth/Throat:      Mouth: Mucous membranes are moist.      Pharynx: Oropharynx is clear. Eyes:      Extraocular Movements: Extraocular movements intact. Conjunctiva/sclera: Conjunctivae normal.      Pupils: Pupils are equal, round, and reactive to light. Cardiovascular:      Rate and Rhythm: Regular rhythm. Tachycardia present. Pulses: Normal pulses. Heart sounds: Normal heart sounds. Pulmonary:      Effort: Pulmonary effort is normal.      Breath sounds: Normal breath sounds. Abdominal:      General: Abdomen is flat. Bowel sounds are normal. There is no distension. Palpations: Abdomen is soft. There is no mass. Tenderness: There is no abdominal tenderness. There is no rebound. Hernia: No hernia is present. Musculoskeletal:         General: Normal range of motion. Cervical back: No rigidity. Skin:     General: Skin is warm.       Capillary Refill: Capillary refill takes less than 2 seconds. Neurological:      General: No focal deficit present. Mental Status: She is alert and oriented to person, place, and time. Mental status is at baseline. Psychiatric:         Mood and Affect: Mood normal.         Thought Content: Thought content normal.         Judgment: Judgment normal.         DIAGNOSTIC RESULTS     EKG: All EKG's are interpreted by the Emergency Department Physician who either signs or Co-signs this chart in the absence of a cardiologist.    EKG showed a normal sinus rhythm with a left bundle branch block which is new from EKGs we have from 2014    RADIOLOGY:   Non-plain film images such as CT, Ultrasound and MRI are read by the radiologist. Plain radiographic images are visualized and preliminarily interpreted by the emergency physician with the below findings:    Chest x-ray showed no acute disease and I reviewed the films and agree with the interpretation    Interpretation per the Radiologist below, if available at the time of this note:    XR CHEST (2 VW)   Final Result   No acute process.                ED BEDSIDE ULTRASOUND:   Performed by ED Physician - none    LABS:  Labs Reviewed   CBC WITH AUTO DIFFERENTIAL - Abnormal; Notable for the following components:       Result Value    Neutrophils Absolute 8.1 (*)     All other components within normal limits    Narrative:     Performed at:  [address] [address]   Phone [phone]   BASIC METABOLIC PANEL W/ REFLEX TO MG FOR LOW K - Abnormal; Notable for the following components:    Potassium reflex Magnesium 3.3 (*)     Chloride 98 (*)     Glucose 127 (*)     All other components within normal limits    Narrative:     Performed at:  [address] [address]   Phone [phone]   TROPONIN    Narrative:     Performed at:  [facility]  [address] [place] [address] signed)  Attending Emergency Physician              Parrish Puckett MD  02/02/22 3230

## 2022-02-03 VITALS
DIASTOLIC BLOOD PRESSURE: 75 MMHG | TEMPERATURE: 98.5 F | WEIGHT: 140.43 LBS | SYSTOLIC BLOOD PRESSURE: 139 MMHG | HEIGHT: 62 IN | RESPIRATION RATE: 19 BRPM | HEART RATE: 86 BPM | BODY MASS INDEX: 25.84 KG/M2 | OXYGEN SATURATION: 97 %

## 2022-02-03 PROBLEM — E87.6 HYPOKALEMIA: Status: ACTIVE | Noted: 2022-02-03

## 2022-02-03 PROBLEM — I44.7 NEW ONSET LEFT BUNDLE BRANCH BLOCK (LBBB): Status: ACTIVE | Noted: 2022-02-03

## 2022-02-03 LAB
ANION GAP SERPL CALCULATED.3IONS-SCNC: 13 MMOL/L (ref 3–16)
BASOPHILS ABSOLUTE: 0.1 K/UL (ref 0–0.2)
BASOPHILS RELATIVE PERCENT: 1 %
BUN BLDV-MCNC: 16 MG/DL (ref 7–20)
CALCIUM SERPL-MCNC: 9.2 MG/DL (ref 8.3–10.6)
CHLORIDE BLD-SCNC: 102 MMOL/L (ref 99–110)
CO2: 26 MMOL/L (ref 21–32)
CREAT SERPL-MCNC: 0.6 MG/DL (ref 0.6–1.2)
EKG ATRIAL RATE: 96 BPM
EKG DIAGNOSIS: NORMAL
EKG P AXIS: 50 DEGREES
EKG P-R INTERVAL: 188 MS
EKG Q-T INTERVAL: 394 MS
EKG QRS DURATION: 130 MS
EKG QTC CALCULATION (BAZETT): 497 MS
EKG R AXIS: -40 DEGREES
EKG T AXIS: 73 DEGREES
EKG VENTRICULAR RATE: 96 BPM
EOSINOPHILS ABSOLUTE: 0.2 K/UL (ref 0–0.6)
EOSINOPHILS RELATIVE PERCENT: 2.7 %
GFR AFRICAN AMERICAN: >60
GFR NON-AFRICAN AMERICAN: >60
GLUCOSE BLD-MCNC: 111 MG/DL (ref 70–99)
HCT VFR BLD CALC: 38 % (ref 36–48)
HEMOGLOBIN: 12.7 G/DL (ref 12–16)
LV EF: 83 %
LVEF MODALITY: NORMAL
LYMPHOCYTES ABSOLUTE: 1.4 K/UL (ref 1–5.1)
LYMPHOCYTES RELATIVE PERCENT: 20.1 %
MCH RBC QN AUTO: 30.9 PG (ref 26–34)
MCHC RBC AUTO-ENTMCNC: 33.4 G/DL (ref 31–36)
MCV RBC AUTO: 92.4 FL (ref 80–100)
MONOCYTES ABSOLUTE: 0.6 K/UL (ref 0–1.3)
MONOCYTES RELATIVE PERCENT: 8.5 %
NEUTROPHILS ABSOLUTE: 4.7 K/UL (ref 1.7–7.7)
NEUTROPHILS RELATIVE PERCENT: 67.7 %
PDW BLD-RTO: 12.7 % (ref 12.4–15.4)
PLATELET # BLD: 293 K/UL (ref 135–450)
PMV BLD AUTO: 7.4 FL (ref 5–10.5)
POTASSIUM REFLEX MAGNESIUM: 3.7 MMOL/L (ref 3.5–5.1)
RBC # BLD: 4.12 M/UL (ref 4–5.2)
SODIUM BLD-SCNC: 141 MMOL/L (ref 136–145)
TROPONIN: <0.01 NG/ML
TROPONIN: <0.01 NG/ML
WBC # BLD: 6.9 K/UL (ref 4–11)

## 2022-02-03 PROCEDURE — 78452 HT MUSCLE IMAGE SPECT MULT: CPT

## 2022-02-03 PROCEDURE — 36415 COLL VENOUS BLD VENIPUNCTURE: CPT

## 2022-02-03 PROCEDURE — 6370000000 HC RX 637 (ALT 250 FOR IP): Performed by: INTERNAL MEDICINE

## 2022-02-03 PROCEDURE — 3430000000 HC RX DIAGNOSTIC RADIOPHARMACEUTICAL: Performed by: INTERNAL MEDICINE

## 2022-02-03 PROCEDURE — G0378 HOSPITAL OBSERVATION PER HR: HCPCS

## 2022-02-03 PROCEDURE — 6360000002 HC RX W HCPCS: Performed by: INTERNAL MEDICINE

## 2022-02-03 PROCEDURE — 93017 CV STRESS TEST TRACING ONLY: CPT

## 2022-02-03 PROCEDURE — 99223 1ST HOSP IP/OBS HIGH 75: CPT | Performed by: INTERNAL MEDICINE

## 2022-02-03 PROCEDURE — 2580000003 HC RX 258: Performed by: INTERNAL MEDICINE

## 2022-02-03 PROCEDURE — 80048 BASIC METABOLIC PNL TOTAL CA: CPT

## 2022-02-03 PROCEDURE — A9502 TC99M TETROFOSMIN: HCPCS | Performed by: INTERNAL MEDICINE

## 2022-02-03 PROCEDURE — 84484 ASSAY OF TROPONIN QUANT: CPT

## 2022-02-03 PROCEDURE — 93010 ELECTROCARDIOGRAM REPORT: CPT | Performed by: INTERNAL MEDICINE

## 2022-02-03 PROCEDURE — 85025 COMPLETE CBC W/AUTO DIFF WBC: CPT

## 2022-02-03 RX ORDER — ACETAMINOPHEN 650 MG/1
650 SUPPOSITORY RECTAL EVERY 4 HOURS PRN
Status: DISCONTINUED | OUTPATIENT
Start: 2022-02-03 | End: 2022-02-03 | Stop reason: HOSPADM

## 2022-02-03 RX ORDER — TRIAMTERENE AND HYDROCHLOROTHIAZIDE 37.5; 25 MG/1; MG/1
1 TABLET ORAL DAILY
Status: DISCONTINUED | OUTPATIENT
Start: 2022-02-03 | End: 2022-02-03 | Stop reason: HOSPADM

## 2022-02-03 RX ORDER — PANTOPRAZOLE SODIUM 40 MG/1
40 TABLET, DELAYED RELEASE ORAL DAILY
Status: DISCONTINUED | OUTPATIENT
Start: 2022-02-03 | End: 2022-02-03 | Stop reason: HOSPADM

## 2022-02-03 RX ORDER — ONDANSETRON 2 MG/ML
4 INJECTION INTRAMUSCULAR; INTRAVENOUS EVERY 4 HOURS PRN
Status: DISCONTINUED | OUTPATIENT
Start: 2022-02-03 | End: 2022-02-03 | Stop reason: HOSPADM

## 2022-02-03 RX ORDER — AMLODIPINE BESYLATE 5 MG/1
5 TABLET ORAL DAILY
Status: DISCONTINUED | OUTPATIENT
Start: 2022-02-03 | End: 2022-02-03 | Stop reason: HOSPADM

## 2022-02-03 RX ORDER — SODIUM CHLORIDE 0.9 % (FLUSH) 0.9 %
10 SYRINGE (ML) INJECTION EVERY 12 HOURS SCHEDULED
Status: DISCONTINUED | OUTPATIENT
Start: 2022-02-03 | End: 2022-02-03 | Stop reason: HOSPADM

## 2022-02-03 RX ORDER — LETROZOLE 2.5 MG/1
2.5 TABLET, FILM COATED ORAL DAILY
Status: DISCONTINUED | OUTPATIENT
Start: 2022-02-03 | End: 2022-02-03 | Stop reason: HOSPADM

## 2022-02-03 RX ORDER — ACETAMINOPHEN 325 MG/1
650 TABLET ORAL EVERY 4 HOURS PRN
Status: DISCONTINUED | OUTPATIENT
Start: 2022-02-03 | End: 2022-02-03 | Stop reason: HOSPADM

## 2022-02-03 RX ORDER — SODIUM CHLORIDE 9 MG/ML
25 INJECTION, SOLUTION INTRAVENOUS PRN
Status: DISCONTINUED | OUTPATIENT
Start: 2022-02-03 | End: 2022-02-03 | Stop reason: HOSPADM

## 2022-02-03 RX ORDER — SODIUM CHLORIDE 9 MG/ML
INJECTION, SOLUTION INTRAVENOUS CONTINUOUS
Status: DISCONTINUED | OUTPATIENT
Start: 2022-02-03 | End: 2022-02-03

## 2022-02-03 RX ORDER — SODIUM CHLORIDE 0.9 % (FLUSH) 0.9 %
10 SYRINGE (ML) INJECTION PRN
Status: DISCONTINUED | OUTPATIENT
Start: 2022-02-03 | End: 2022-02-03 | Stop reason: HOSPADM

## 2022-02-03 RX ORDER — POLYETHYLENE GLYCOL 3350 17 G/17G
17 POWDER, FOR SOLUTION ORAL DAILY PRN
Status: DISCONTINUED | OUTPATIENT
Start: 2022-02-03 | End: 2022-02-03 | Stop reason: HOSPADM

## 2022-02-03 RX ORDER — LEVOTHYROXINE SODIUM 0.05 MG/1
50 TABLET ORAL DAILY
Status: DISCONTINUED | OUTPATIENT
Start: 2022-02-03 | End: 2022-02-03 | Stop reason: HOSPADM

## 2022-02-03 RX ORDER — ASPIRIN 81 MG/1
81 TABLET ORAL DAILY
Status: DISCONTINUED | OUTPATIENT
Start: 2022-02-03 | End: 2022-02-03 | Stop reason: HOSPADM

## 2022-02-03 RX ADMIN — LETROZOLE 2.5 MG: 2.5 TABLET, FILM COATED ORAL at 08:19

## 2022-02-03 RX ADMIN — PANTOPRAZOLE SODIUM 40 MG: 40 TABLET, DELAYED RELEASE ORAL at 06:11

## 2022-02-03 RX ADMIN — TRIAMTERENE AND HYDROCHLOROTHIAZIDE 1 TABLET: 37.5; 25 TABLET ORAL at 08:19

## 2022-02-03 RX ADMIN — TETROFOSMIN 30 MILLICURIE: 1.38 INJECTION, POWDER, LYOPHILIZED, FOR SOLUTION INTRAVENOUS at 11:10

## 2022-02-03 RX ADMIN — SODIUM CHLORIDE: 9 INJECTION, SOLUTION INTRAVENOUS at 03:18

## 2022-02-03 RX ADMIN — ASPIRIN 81 MG: 81 TABLET ORAL at 08:19

## 2022-02-03 RX ADMIN — POTASSIUM BICARBONATE 40 MEQ: 782 TABLET, EFFERVESCENT ORAL at 01:28

## 2022-02-03 RX ADMIN — LEVOTHYROXINE SODIUM 50 MCG: 50 TABLET ORAL at 06:02

## 2022-02-03 RX ADMIN — TETROFOSMIN 10 MILLICURIE: 1.38 INJECTION, POWDER, LYOPHILIZED, FOR SOLUTION INTRAVENOUS at 07:49

## 2022-02-03 RX ADMIN — REGADENOSON 0.4 MG: 0.08 INJECTION, SOLUTION INTRAVENOUS at 10:43

## 2022-02-03 RX ADMIN — AMLODIPINE BESYLATE 5 MG: 5 TABLET ORAL at 08:19

## 2022-02-03 ASSESSMENT — PAIN SCALES - GENERAL: PAINLEVEL_OUTOF10: 0

## 2022-02-03 NOTE — PLAN OF CARE
Problem: Coping:  Goal: Family's ability to cope with current situation will improve  Description: Family's ability to cope with current situation will improve  2/3/2022 0753 by Ramirez Carpio RN  Outcome: Ongoing  2/3/2022 0551 by Leanne Javier RN  Outcome: Ongoing  2/3/2022 0444 by Leanne Javier RN  Outcome: Ongoing     Problem: Health Behavior:  Goal: Ability to identify and utilize available support systems will improve  Description: Ability to identify and utilize available support systems will improve  2/3/2022 0753 by Ramirez Carpio RN  Outcome: Ongoing  2/3/2022 0551 by Leanne Javier RN  Outcome: Ongoing  2/3/2022 0444 by Leanne Javier RN  Outcome: Ongoing

## 2022-02-03 NOTE — FLOWSHEET NOTE
Patient arrived via stretcher to room 4126 from ED at 0200h. Patient came in the ER from her PCP office Dr. Ava Velarde after c/o nausea this morning. ER note stated she has chest pain and a \"funny feeling on her chest. She was also tachcardic. EKG done at PCP office revealed a new onset of Left BBB. Admitted to [facility] for observation. NPO effective immediately. Cardiac stress test with pharm in the a.m. Patient pleasant , cooperative, alert and oriented. Has no pain complaint. Denies she ever had chest pain. No n/v. IVF started at 75ml/hr. Patient settled in her room, oriented to call light. . Given fresh gown and prefers to wear her own pajamas. Continue to monitor. VS WNL. 96% on RA. Telemonitor in place.

## 2022-02-03 NOTE — CONSULTS
[facility]  Cardiology Consult    Kye Peters  [birth year]    February 3, 2022      Reason for Referral: Left bundle branch block uncontrolled blood pressure    CC: Not feeling well      Subjective:     History of Present Illness:    Karla Saleh is a 77 y.o. patient with a PMH significant for hypertension hyperlipidemia presented with complaints of uncontrolled hypertension. Mild chest discomfort. Nausea diaphoresis new onset left bundle branch block. Patient came also complaining of rapid heart rate. Elevated blood pressure. This happened after getting up in the morning. She also felt nauseous. Mild chest discomfort. No loss of consciousness diaphoresis or shortness of breath reported. EKG shows new left bundle branch block but troponins were negative. Past Medical History:   has a past medical history of Allergic rhinitis, Cancer ([address] Diverticulitis, Diverticulitis, GERD (gastroesophageal reflux disease), Hearing loss, Hyperlipidemia, Hypertension, Osteoarthritis, and Pancreatic mass. Surgical History:   has a past surgical history that includes bladder suspension (2007); Hysterectomy (2007); Colonoscopy (1/2012); and Hysterectomy, total abdominal.     Social History:   reports that she quit smoking about 20 years ago. She has never used smokeless tobacco. She reports current alcohol use. She reports that she does not use drugs. Family History:  family history includes Stroke in her father. Home Medications:  Were reviewed and are listed in nursing record and/or below  Prior to Admission medications    Medication Sig Start Date End Date Taking?  Authorizing Provider   Multiple Vitamins-Minerals (THERAPEUTIC MULTIVITAMIN-MINERALS) tablet Take 1 tablet by mouth daily   Yes Historical Provider, MD   vitamin C (ASCORBIC ACID) 500 MG tablet Take 500 mg by mouth daily   Yes Historical Provider, MD   pitavastatin (LIVALO) 2 MG TABS tablet TAKE 1 TABLET EVERY  DAY. 1/20/22  Yes Rell Aguilar Dandy Rogers MD   levothyroxine (SYNTHROID) 50 MCG tablet TAKE ONE TABLET BY MOUTH DAILY 1/20/22  Yes Leisa Suh MD   vitamin D (ERGOCALCIFEROL) 1.25 MG (65330 UT) CAPS capsule Once caps every 2 weeks 1/20/22  Yes Leisa Suh MD   triamterene-hydroCHLOROthiazide [facility]) 37.5-25 MG per tablet TAKE ONE TABLET BY MOUTH EVERY DAY 1/10/22  Yes Natan Eric MD   alendronate (FOSAMAX) 70 MG tablet Take 1 tablet by mouth every 7 days Take once per week in the morning with a full glass of water, on an empty stomach, and do not take anything else by mouth or lie down for the next 30 minutes.  10/5/21  Yes Leisa Suh MD   omeprazole (PRILOSEC) 20 MG delayed release capsule TAKE ONE CAPSULE BY MOUTH EVERY DAY 9/27/21  Yes Natan Eric MD   amLODIPine (NORVASC) 5 MG tablet TAKE ONE TABLET BY MOUTH EVERY DAY 7/9/21  Yes Natan Eric MD   letrozole [facility]) 2.5 MG tablet Take 2.5 mg by mouth daily   Yes Historical Provider, MD   aspirin 81 MG EC tablet Take 81 mg by mouth daily    Yes Historical Provider, MD        CURRENT Medications:  triamterene-hydroCHLOROthiazide (MAXZIDE-25) 37.5-25 MG per tablet 1 tablet, Daily  pantoprazole (PROTONIX) tablet 40 mg, Daily  levothyroxine (SYNTHROID) tablet 50 mcg, Daily  letrozole [facility]) tablet 2.5 mg, Daily  aspirin EC tablet 81 mg, Daily  amLODIPine (NORVASC) tablet 5 mg, Daily  sodium chloride flush 0.9 % injection 10 mL, 2 times per day  sodium chloride flush 0.9 % injection 10 mL, PRN  0.9 % sodium chloride infusion, PRN  ondansetron (ZOFRAN) injection 4 mg, Q4H PRN  polyethylene glycol (GLYCOLAX) packet 17 g, Daily PRN  acetaminophen (TYLENOL) tablet 650 mg, Q4H PRN   Or  acetaminophen (TYLENOL) suppository 650 mg, Q4H PRN  enoxaparin (LOVENOX) injection 40 mg, Daily        Allergies:  Azithromycin, Crestor [rosuvastatin], Lipitor [atorvastatin], and Pravastatin           Review of Systems: SEE HPI   · Constitutional: no unanticipated weight loss. There's been no change in energy level, sleep pattern, or activity level. No fevers, chills. · Eyes: No visual changes or diplopia. No scleral icterus. · ENT: No Headaches, hearing loss or vertigo. No mouth sores or sore throat. · Cardiovascular: No Chest pain, tightness or discomfort.  No Shortness of breath. No Dyspnea on exertion, Orthopnea, Paroxysmal nocturnal dyspnea or breathlessness at rest.   No Palpitations.  No Syncope ('blackouts', 'faints', 'collapse') or dizziness. · Respiratory: No cough or wheezing, no sputum production. No hematemesis. · Gastrointestinal: No abdominal pain, appetite loss, blood in stools. No change in bowel or bladder habits. · Genitourinary: No dysuria, trouble voiding, or hematuria. · Musculoskeletal:  No gait disturbance, no joint complaints. · Integumentary: No rash or pruritis. · Neurological: No headache, diplopia, change in muscle strength, numbness or tingling. · Psychiatric: No anxiety or depression. · Endocrine: No temperature intolerance. No excessive thirst, fluid intake, or urination. No tremor. · Hematologic/Lymphatic: No abnormal bruising or bleeding, blood clots or swollen lymph nodes. · Allergic/Immunologic: No nasal congestion or hives. Objective:     PHYSICAL EXAM:      Vitals:    02/03/22 0545   BP: 139/75   Pulse: 86   Resp:    Temp: 98.5 °F (36.9 °C)   SpO2: 97%    Weight: 140 lb 6.9 oz (63.7 kg)       General Appearance:  Alert, cooperative, no distress, appears stated age. Head:  Normocephalic, without obvious abnormality, atraumatic. Eyes:  Pupils equal and round. No scleral icterus. Mouth: Moist mucosa, no pharyngeal erythema. Nose: Nares normal. No drainage or sinus tenderness. Neck: Supple, symmetrical, trachea midline. No adenopathy. No tenderness/mass/nodules. No carotid bruit or elevated JVD.    Lungs:   Respiratory Effort: Normal   Auscultation: Clear to auscultation bilaterally, respirations unlabored. No wheeze, rales   Chest Wall:  No tenderness or deformity. Cardiovascular:    Pulses  Palpation: normal   Ascultation: Regular rate, S1/ S2 normal. No murmur, rub, or gallop. 2+ radial and pedal pulses, symmetric  Carotid  Femoral   Abdomen and Gastrointestinal:   Soft, non-tender, bowel sounds active. Liver and Spleen  Masses   Musculoskeletal: No muscle wasting  Back  Gait   Extremities: Extremities normal, atraumatic. No cyanosis or edema. No cyanosis clubbing       Skin: Inspection and palpation performed, no rashes or lesions. Pysch: Normal mood and affect. Alert and oriented to time place person   Neurologic: Normal gross motor and sensory exam.       Labs     All labs have been reviewed    Lab Results   Component Value Date    WBC 6.9 02/03/2022    RBC 4.12 02/03/2022    HGB 12.7 02/03/2022    HCT 38.0 02/03/2022    MCV 92.4 02/03/2022    RDW 12.7 02/03/2022     02/03/2022     Lab Results   Component Value Date     02/03/2022    K 3.7 02/03/2022     02/03/2022    CO2 26 02/03/2022    BUN 16 02/03/2022    CREATININE 0.6 02/03/2022    GFRAA >60 02/03/2022    AGRATIO 1.9 07/07/2020    LABGLOM >60 02/03/2022    GLUCOSE 111 02/03/2022    PROT 6.9 07/07/2020    CALCIUM 9.2 02/03/2022    BILITOT 0.4 07/07/2020    ALKPHOS 76 07/07/2020    AST 21 07/07/2020    ALT 17 07/07/2020     No results found for: PTINR  Lab Results   Component Value Date    LABA1C 5.6 07/13/2021     Lab Results   Component Value Date    CKTOTAL 67 06/02/2016    TROPONINI <0.01 02/03/2022       Cardiac, Vascular and Imaging Data all Personally Reviewed in Detail by Myself      EKG: Normal sinus rhythmLeft axis deviationLeft bundle branch blockConfirmed by Deja DOTY MD     Echocardiogram:     Stress Test:     Cath: Other imaging:     Assessment and Plan     -Uncontrolled hypertension with mild chest discomfort. Nausea. She is feeling much better continue blood pressure management control.   We will review stress test.  No evidence of acute coronary syndrome or unstable angina. If stress test is negative she can be discharged home. Thank you for allowing us to participate in the care of [facility]. Please do not hesitate to contact me if you have any questions.     Leighann Gómez MD, MPH    [facility], [address] [facility]  Ph: [phone]  Fax: [phone]    2/3/2022 11:20 AM

## 2022-02-03 NOTE — PROGRESS NOTES
Discharge instructions discussed with Pt. IV removed without complications. Dry dressing applied. Tele monitor removed. Pt waiting on daughter and then wheelchair will be ordered for discharge.

## 2022-02-03 NOTE — FLOWSHEET NOTE
Wolf Parrish ...................... 4 Eyes Skin Assessment     NAME:  Chari Kamara  YOB: 1945  MEDICAL RECORD NUMBER:  [id number]    The patient is being assess for  Admission    I agree that 2 RN's have performed a thorough Head to Toe Skin Assessment on the patient. ALL assessment sites listed below have been assessed. Areas assessed by both nurses:    Head, Face, Ears, Shoulders, Back, Chest, Arms, Elbows, Hands, Sacrum. Buttock, Coccyx, Ischium and Legs. Feet and Heels        Does the Patient have a Wound?  No noted wound(s)       Porfirio Prevention initiated:  NA   Wound Care Orders initiated:  NA    Pressure Injury (Stage 3,4, Unstageable, DTI, NWPT, and Complex wounds) if present place consult order under [de-identified] NA    New and Established Ostomies if present place consult order under : NA      Nurse 1 eSignature: Electronically signed by Luan Harada, RN on 2/3/22 at 3:02 AM EST    **SHARE this note so that the co-signing nurse is able to place an eSignature**    Nurse 2 eSignature: Electronically signed by Ramon Al RN on 2/3/2022 at 3:09 AM

## 2022-02-03 NOTE — PLAN OF CARE
Problem: Coping:  Goal: Family's ability to cope with current situation will improve  Description: Family's ability to cope with current situation will improve  2/3/2022 1351 by Virginie Lee RN  Outcome: Completed  2/3/2022 0753 by Virginie Lee RN  Outcome: Ongoing  2/3/2022 0551 by Sheila Lucas RN  Outcome: Ongoing  2/3/2022 0444 by Sheila Lucas RN  Outcome: Ongoing     Problem: Health Behavior:  Goal: Ability to identify and utilize available support systems will improve  Description: Ability to identify and utilize available support systems will improve  2/3/2022 1351 by Virginie Lee RN  Outcome: Completed  2/3/2022 0753 by Virginie Lee RN  Outcome: Ongoing  2/3/2022 0551 by Sheila Lucas RN  Outcome: Ongoing  2/3/2022 0444 by Sheila Lucas RN  Outcome: Ongoing

## 2022-02-03 NOTE — DISCHARGE SUMMARY
Hospital Medicine Discharge Summary    Patient ID: Nely Ovalle      Patient's PCP: Diana Mendez MD    Admit Date: 2/2/2022     Discharge Date:   2/3/22    Admitting Physician: Nallely Bates MD     Discharge Physician: John Butts, APRN - CNP       Discharge Diagnoses: Active Hospital Problems    Diagnosis Date Noted    Hypokalemia [E87.6] 02/03/2022    New onset left bundle branch block (LBBB) [I44.7] 02/03/2022    Chest pain [R07.9] 02/02/2022    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, [facility] [C50.619, Z17.1] 06/18/2019    Acquired hypothyroidism [E03.9] 11/30/2017    Hyperlipidemia [E78.5] 10/18/2012    Essential hypertension [I10] 10/18/2012       The patient was seen and examined on day of discharge and this discharge summary is in conjunction with any daily progress note from day of discharge. Disposition:  [x] Home  [] Home with home health [] Rehab [] Psych [] SNF  [] LTAC  [] Long term nursing home or group home [] Transfer to ICU  [] Transfer to PCU [] Other:    Hospital Course: Pt is an 77 y. o. year-old female with a history of hypertension, hyperlipidemia, hypothyroidism GERD and breast cancer.  She presents to the emergency room for evaluation of a rapid heart rate and elevated blood pressure.  She states that she, \"just does not feel right. \" She states that her symptoms began about 1 or 2 hours after awakening this morning.  She notes that she began to feel nauseated, and took her blood pressure on her home equipment. Landry Fishman heart rate was running in the 100-110's, and her blood pressure was 156/89.  She reports that she just felt poorly in general.  In the emergency room her EKG revealed a new left bundle branch block, and her initial troponin was not elevated.  She will be admitted for further evaluation and treatment.  Associated signs and symptoms do not include chest pain, shortness of breath, diaphoresis, edema, orthopnea, paroxysmal nocturnal dyspnea, fever or chills.     Interval History/Subjective: patient sitting up in bed eating lunch. Feels nausea but like like she felt like her heart was off center or was beating too fast possibly. States her BP was high at home 160 SBP which is higher than her normal.  She states it does vary at times.          Chest pain with new onset left bundle branch block (LBBB) - Patient will be admitted and monitored on telemetry, and we will check serial cardiac enzymes. Aspirin was started in the Emergency Room. Cardiac testing has been ordered for risk stratification. I have discussed other possible etiologies of the symptoms such as Musculoskeletal Pain and GERD, including unique presenting characteritics of each. Patient understands that if the evaluation does not show that the symptoms are secondary to ischemic changes, she will be discharged and instructed to follow up with her outpatient physician to help determine the etiology of her symptoms. - cardiology consulted   - stress test normal  - trop neg   - EKG with NSR, L BBB new since EKG 2014, no ST elevation/ectopy  - Dr. 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  Continue statin therapy while in the hospital  - last lipids 1/17/22 Chol 258 down from prior / HDL 96 /  / triglyceride 155  Exam:     /75   Pulse 86   Temp 98.5 °F (36.9 °C) (Oral)   Resp 19   Ht 5' 2\" (1.575 m)   Wt 140 lb 6.9 oz (63.7 kg)   SpO2 97%   BMI 25.69 kg/m²   General appearance: No apparent distress, appears stated age and cooperative. HEENT: Pupils equal, round, and reactive to light. Conjunctivae/corneas clear. Neck: Supple, with full range of motion. No jugular venous distention. Trachea midline. Respiratory:  Normal respiratory effort. Clear to auscultation, bilaterally without Rales/Wheezes/Rhonchi. Cardiovascular: Regular rate and rhythm with normal S1/S2 without murmurs, rubs or gallops. Abdomen: Soft, non-tender, non-distended with normal bowel sounds. Musculoskelatal: No clubbing, cyanosis or edema bilaterally. Full range of motion without deformity. Skin: Skin color, texture, turgor normal.  No rashes or lesions. Neurologic:  Neurovascularly intact without any focal sensory/motor deficits. Cranial nerves: II-XII intact, grossly non-focal.  Psychiatric: Alert and oriented, thought content appropriate, normal insight      Consults:     IP CONSULT TO CARDIOLOGY    Diagnostic tests:    Summary    Normal myocardial perfusion study.    Normal myocardial perfusion.    Normal LV size and systolic function.    Overall findings represent a low risk study. Labs:  For convenience and continuity at follow-up the following most recent labs are provided:      CBC:    Lab Results   Component Value Date    WBC 6.9 02/03/2022    HGB 12.7 02/03/2022    HCT 38.0 02/03/2022     02/03/2022       Renal:    Lab Results   Component Value Date     02/03/2022    K 3.7 02/03/2022     02/03/2022    CO2 26 02/03/2022    BUN 16 02/03/2022    CREATININE 0.6 02/03/2022    CALCIUM 9.2 02/03/2022           Discharge Instructions/Follow-up:  Dr. Claudene March pcp 1wk, Dr. Kaylie Grimm as needed    D/C condition: stable    Code status: full    Activity: ad jose guadalupe     Diet: general       Discharge Medications:     Current Discharge Medication List           Details   Multiple Vitamins-Minerals (THERAPEUTIC MULTIVITAMIN-MINERALS) tablet Take 1 tablet by mouth daily      vitamin C (ASCORBIC ACID) 500 MG tablet Take 500 mg by mouth daily pitavastatin (LIVALO) 2 MG TABS tablet TAKE 1 TABLET EVERY  DAY. Qty: 90 tablet, Refills: 3    Associated Diagnoses: Pure hypercholesterolemia      levothyroxine (SYNTHROID) 50 MCG tablet TAKE ONE TABLET BY MOUTH DAILY  Qty: 90 tablet, Refills: 3      vitamin D (ERGOCALCIFEROL) 1.25 MG (77937 UT) CAPS capsule Once caps every 2 weeks  Qty: 7 capsule, Refills: 3    Associated Diagnoses: Vitamin D deficiency      triamterene-hydroCHLOROthiazide (MAXZIDE-25) 37.5-25 MG per tablet TAKE ONE TABLET BY MOUTH EVERY DAY  Qty: 90 tablet, Refills: 1    Associated Diagnoses: Essential hypertension      alendronate (FOSAMAX) 70 MG tablet Take 1 tablet by mouth every 7 days Take once per week in the morning with a full glass of water, on an empty stomach, and do not take anything else by mouth or lie down for the next 30 minutes. Qty: 13 tablet, Refills: 3      omeprazole (PRILOSEC) 20 MG delayed release capsule TAKE ONE CAPSULE BY MOUTH EVERY DAY  Qty: 90 capsule, Refills: 0    Associated Diagnoses: Statin intolerance      amLODIPine (NORVASC) 5 MG tablet TAKE ONE TABLET BY MOUTH EVERY DAY  Qty: 90 tablet, Refills: 2    Associated Diagnoses: Essential hypertension      letrozole (FEMARA) 2.5 MG tablet Take 2.5 mg by mouth daily      aspirin 81 MG EC tablet Take 81 mg by mouth daily              Time Spent on discharge is more than 30 minutes in the examination, evaluation, counseling and review of medications and discharge plan. Signed:    CAROLINA Holder - CNP   2/3/2022      Thank you Finesse Zuniga MD for the opportunity to be involved in this patient's care. If you have any questions or concerns please feel free to contact me at [phone].

## 2022-02-03 NOTE — PLAN OF CARE
Problem: Coping:  Goal: Family's ability to cope with current situation will improve  Description: Family's ability to cope with current situation will improve  2/3/2022 0551 by Kylee Koch RN  Outcome: Ongoing  2/3/2022 0444 by Kylee Koch RN  Outcome: Ongoing     Problem: Health Behavior:  Goal: Ability to identify and utilize available support systems will improve  Description: Ability to identify and utilize available support systems will improve  2/3/2022 0551 by Kylee Koch RN  Outcome: Ongoing  2/3/2022 0444 by Kylee Koch RN  Outcome: Ongoing

## 2022-02-03 NOTE — CARE COORDINATION
Discharge Planning:  Chart reviewed. Unable to reach pt via phone. No d/c needs noted. Pt to return home. Dtr to transport pt home.    Yin Aguayo  [phone]  Electronically signed by Donita Robb on 2/3/2022 at 2:19 PM

## 2022-02-03 NOTE — PLAN OF CARE
Problem: Coping:  Goal: Family's ability to cope with current situation will improve  Description: Family's ability to cope with current situation will improve  2/3/2022 1351 by Karl Sams RN  Outcome: Completed  2/3/2022 0753 by Karl Sams RN  Outcome: Ongoing  2/3/2022 0551 by Dinesh Hinson RN  Outcome: Ongoing  2/3/2022 0444 by Dinesh Hinson RN  Outcome: Ongoing     Problem: Health Behavior:  Goal: Ability to identify and utilize available support systems will improve  Description: Ability to identify and utilize available support systems will improve  2/3/2022 1351 by Karl Sams RN  Outcome: Completed  2/3/2022 0753 by Kral Sams RN  Outcome: Ongoing  2/3/2022 0551 by Dinesh Hinson RN  Outcome: Ongoing  2/3/2022 0444 by Dinesh Hinson RN  Outcome: Ongoing

## 2022-02-03 NOTE — PROGRESS NOTES
Pharmacy Medication Reconciliation Note     List of medications Lennox Wellington is currently taking is complete. Source of information:   1. Conversation with patient at bedside  2. EMR    Notes regarding home medications:   1. Patient reports taking all AM meds PTA in the ED  2.  Patient reports Livalo taken QAM and has intolerances to other statins    Patient denies taking any other OTC or herbal medications other than those listed     Declan Alas, Pharmacy Intern  2/2/2022  7:15 PM

## 2022-02-03 NOTE — PLAN OF CARE
Problem: Coping:  Goal: Family's ability to cope with current situation will improve  Description: Family's ability to cope with current situation will improve  2/3/2022 1351 by Aman Akers RN  Outcome: Completed  2/3/2022 0753 by Aman Akers RN  Outcome: Ongoing  2/3/2022 0551 by Alvarez Sinha RN  Outcome: Ongoing  2/3/2022 0444 by Alvarez Sinha RN  Outcome: Ongoing     Problem: Health Behavior:  Goal: Ability to identify and utilize available support systems will improve  Description: Ability to identify and utilize available support systems will improve  2/3/2022 1351 by Aman Akers RN  Outcome: Completed  2/3/2022 0753 by Aman Akers RN  Outcome: Ongoing  2/3/2022 0551 by Alvarez Sinha RN  Outcome: Ongoing  2/3/2022 0444 by Alvarez Sinha RN  Outcome: Ongoing

## 2022-02-03 NOTE — PROGRESS NOTES
[facility] Medicine Progress Note      Admit Date: 2/2/2022       CC: F/U for \"just doesn't feel right\", nausea, rapid HR, elevated BP new LBBB on EKG by PCP    HPI: Pt is an 77y.o. year-old female with a history of hypertension, hyperlipidemia, hypothyroidism GERD and breast cancer. She presents to the emergency room for evaluation of a rapid heart rate and elevated blood pressure. She states that she, \"just does not feel right. \" She states that her symptoms began about 1 or 2 hours after awakening this morning. She notes that she began to feel nauseated, and took her blood pressure on her home equipment. Her heart rate was running in the 100-110's, and her blood pressure was 156/89. She reports that she just felt poorly in general.  In the emergency room her EKG revealed a new left bundle branch block, and her initial troponin was not elevated. She will be admitted for further evaluation and treatment. Associated signs and symptoms do not include chest pain, shortness of breath, diaphoresis, edema, orthopnea, paroxysmal nocturnal dyspnea, fever or chills.     Interval History/Subjective: patient sitting up in bed eating lunch. Feels nausea but like like she felt like her heart was off center or was beating too fast possibly. States her BP was high at home 160 SBP which is higher than her normal.  She states it does vary at times. Review of Systems:       The patient denied headaches, visual changes, LOC, SOB, CP, ABD pain, N/V/D, skin changes, new or worsening weakness or neuromuscular deficits. Comprehensive ROS negative except as mentioned above.     Past Medical History:        Diagnosis Date    Allergic rhinitis     Cancer ([facility]     lumpectomy 11-18    Diverticulitis     Diverticulitis 3/1/16    GERD (gastroesophageal reflux disease)     Hearing loss     Hyperlipidemia     Hypertension     Osteoarthritis     Pancreatic mass tail       Past Surgical History:        Procedure Laterality Date    BLADDER SUSPENSION  2007    COLONOSCOPY  1/2012    polyp-ghastine    HYSTERECTOMY  2007    HYSTERECTOMY, TOTAL ABDOMINAL         Allergies:  Azithromycin, Crestor [rosuvastatin], Lipitor [atorvastatin], and Pravastatin    Past medical and surgical history reviewed. Any changes have been noted. PHYSICAL EXAM:  /75   Pulse 86   Temp 98.5 °F (36.9 °C) (Oral)   Resp 19   Ht 5' 2\" (1.575 m)   Wt 140 lb 6.9 oz (63.7 kg)   SpO2 97%   BMI 25.69 kg/m²     No intake or output data in the 24 hours ending 02/03/22 0940     General appearance:   No apparent distress, appears stated age. Cooperative. HEENT:  Normocephalic, atraumatic. PERRLA. EOMi. Conjunctivae/corneas clear, no icterus, non-injected. Neck: Supple, with full range of motion. No jugular venous distention. Trachea midline. Respiratory:  Normal respiratory effort. Clear to auscultation, bilaterally without Rales/Wheezes/Rhonchi. Cardiovascular:  Regular rate and rhythm without murmurs, rubs or gallops. Abdomen: Soft, non-tender, non-distended, without rebound or guarding. Normal bowel sounds. Musculoskeletal:  No clubbing, cyanosis or edema bilaterally. Full range of motion without deformity. Skin: Skin color, texture, turgor normal.  No rashes or lesions. Neurologic:  Neurovascularly intact without any focal sensory/motor deficits. Cranial nerves: II-XII intact, grossly intact. No facial asymmetry, tongue midline.    Psychiatric:  Alert and oriented, thought content appropriate  Capillary Refill: Brisk,< 3 seconds   Peripheral Pulses: +2 palpable, equal bilaterally       LABS:    Lab Results   Component Value Date    WBC 6.9 02/03/2022    HGB 12.7 02/03/2022    HCT 38.0 02/03/2022    MCV 92.4 02/03/2022     02/03/2022    LYMPHOPCT 20.1 02/03/2022    RBC 4.12 02/03/2022    MCH 30.9 02/03/2022    MCHC 33.4 02/03/2022    RDW 12.7 02/03/2022       Lab Results   Component Value Date    CREATININE 0.6 02/03/2022 BUN 16 02/03/2022     02/03/2022    K 3.7 02/03/2022     02/03/2022    CO2 26 02/03/2022       Lab Results   Component Value Date    MG 2.10 02/02/2022       Lab Results   Component Value Date    ALT 17 07/07/2020    AST 21 07/07/2020    ALKPHOS 76 07/07/2020    BILITOT 0.4 07/07/2020        No flowsheet data found. Lab Results   Component Value Date    LABA1C 5.6 07/13/2021       Imaging:  XR CHEST (2 VW)   Final Result   No acute process. NM Cardiac Stress Test Nuclear Imaging    (Results Pending)       Scheduled and prn Medications:    Scheduled Meds:   triamterene-hydroCHLOROthiazide  1 tablet Oral Daily    pantoprazole  40 mg Oral Daily    levothyroxine  50 mcg Oral Daily    letrozole  2.5 mg Oral Daily    aspirin  81 mg Oral Daily    amLODIPine  5 mg Oral Daily    sodium chloride flush  10 mL IntraVENous 2 times per day    enoxaparin  40 mg SubCUTAneous Daily     Continuous Infusions:   sodium chloride      sodium chloride 75 mL/hr at 02/03/22 0318     PRN Meds:.sodium chloride flush, sodium chloride, ondansetron, polyethylene glycol, acetaminophen **OR** acetaminophen, regadenoson, technetium tetrofosmin    Assessment & Plan:          Chest pain with new onset left bundle branch block (LBBB) - Patient will be admitted and monitored on telemetry, and we will check serial cardiac enzymes. Aspirin was started in the Emergency Room. Cardiac testing has been ordered for risk stratification. I have discussed other possible etiologies of the symptoms such as Musculoskeletal Pain and GERD, including unique presenting characteritics of each. Patient understands that if the evaluation does not show that the symptoms are secondary to ischemic changes, she will be discharged and instructed to follow up with her outpatient physician to help determine the etiology of her symptoms.    - cardiology consulted   - stress test normal  - trop neg   - EKG with NSR, L BBB new since EKG 2014, no ST elevation/ectopy  - Dr. 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 Continue statin therapy while in the hospital  - last lipids 1/17/22 Chol 258 down from prior / HDL 96 /  / triglyceride 155       Continue current regimen/therapies. Monitor. Adjust medical regimen as appropriate. Body mass index is 25.69 kg/m². The patient and / or the family were informed of the results of any tests, a time was given to answer questions, a plan was proposed and they agreed with plan.       DVT ppx: lovenox      Diet: Diet NPO Exceptions are: Sips of Water with Meds, Ice Chips    Consults:  IP CONSULT TO CARDIOLOGY    DISPO/placement plan: pending    Code Status: Full Code      CAROLINA Tinajero - MARLA  02/03/22

## 2022-02-03 NOTE — PLAN OF CARE
Problem: Coping:  Goal: Family's ability to cope with current situation will improve  Description: Family's ability to cope with current situation will improve  Outcome: Ongoing     Problem: Health Behavior:  Goal: Ability to identify and utilize available support systems will improve  Description: Ability to identify and utilize available support systems will improve  Outcome: Ongoing

## 2022-02-03 NOTE — CONSULTS
[facility] 82  Cardiology Consult Note        CC:      Chest pain           HPI:   This is a 77 y.o. female with no history of coronary disease who woke up yesterday from sleep and felt nauseated. Sometime later she also felt something funny in her chest.  She denies chest pain but states that it may have been fluttering or palpitations. She went to see her primary care doctor. She was seen there by a nurse practitioner who saw something abnormal on the EKG and referred her to the ER. That abnormality appears to be left bundle branch block which is new onset. The patient is otherwise fairly active there is no history of chest pain in her daily life with activities of daily living  She has no family history for CAD no diabetes: Quit smoking 20 years ago. She has hyperlipidemia but her HDL is also in the 90s.   She is on a statin      Past Medical History:   Diagnosis Date    Allergic rhinitis     Cancer ([facility] [address]     lumpectomy     Diverticulitis     Diverticulitis 3/1/16    GERD (gastroesophageal reflux disease)     Hearing loss     Hyperlipidemia     Hypertension     Osteoarthritis     Pancreatic mass tail      Past Surgical History:   Procedure Laterality Date    BLADDER SUSPENSION      COLONOSCOPY  2012    polyp-ghastine    HYSTERECTOMY  2007    HYSTERECTOMY, TOTAL ABDOMINAL        Family History   Problem Relation Age of Onset    Stroke Father       Social History     Tobacco Use    Smoking status: Former Smoker     Quit date: 2001     Years since quittin.2    Smokeless tobacco: Never Used   Vaping Use    Vaping Use: Not on file   Substance Use Topics    Alcohol use: Yes     Comment: socially    Drug use: No      Allergies   Allergen Reactions    Azithromycin Nausea Only     Other reaction(s): Muscle Aches    Crestor [Rosuvastatin] Diarrhea     Leg cramps     Lipitor [Atorvastatin]      Leg cramps     Pravastatin Other (See Comments)     Muscle aches and leg cramps      triamterene-hydroCHLOROthiazide  1 tablet Oral Daily    pantoprazole  40 mg Oral Daily    levothyroxine  50 mcg Oral Daily    letrozole  2.5 mg Oral Daily    aspirin  81 mg Oral Daily    amLODIPine  5 mg Oral Daily    sodium chloride flush  10 mL IntraVENous 2 times per day    enoxaparin  40 mg SubCUTAneous Daily       Review of Systems -   Constitutional: Negative for weight gain/loss; malaise, fever  Respiratory: Negative for Asthma;  cough and hemoptysis  Cardiovascular: Negative for palpitations,dizziness   Gastrointestinal: Negative for abd.pain; constipation/diarrhea;    Genitourinary: Negative for stones; hematuria; frequency hesitancy  Integumentt: Negative for rash or pruritis  Hematologic/lymphatic: Negative for blood dyscrasia; leukemia/lymphoma  Musculoskeletal: Negative for Connective tissue disease  Neurological:  Negative for Seizure   Behavioral/Psych:Negative for Bipolar disorder, Schizophrenia; Dementia  Endocrine: negative for thyroid, parathyroid disease    No intake or output data in the 24 hours ending 02/03/22 1041    Physical Examination:    /75   Pulse 86   Temp 98.5 °F (36.9 °C) (Oral)   Resp 19   Ht 5' 2\" (1.575 m)   Wt 140 lb 6.9 oz (63.7 kg)   SpO2 97%   BMI 25.69 kg/m²    HEENT:  Face: Atraumatic, Conjunctiva: Pink; non icteric,  Mucous Memb:  Moist, No thyromegaly or Lymphadenopathy  Respiratory:  Resp Assessment: normal, Resp Auscultation: clear   Cardiovascular: Auscultation: nl S1 & S2, Palpation:  Nl PMI;  No heaves or thrills, JVP:  normal  Abdomen: Soft, non-tender, Normal bowel sounds,  No organomegaly  Extremities: No Cyanosis or Clubbing; Edema none  Neurological: Oriented to time, place, and person, Non-anxious  Psychiatric: Normal mood and affect  Skin: Warm and dry,  No rash seen      Current Facility-Administered Medications: triamterene-hydroCHLOROthiazide (MAXZIDE-25) 37.5-25 MG per tablet 1 tablet, 1 tablet, Oral, Daily  pantoprazole (PROTONIX) tablet 40 mg, 40 mg, Oral, Daily  levothyroxine (SYNTHROID) tablet 50 mcg, 50 mcg, Oral, Daily  letrozole (FEMARA) tablet 2.5 mg, 2.5 mg, Oral, Daily  aspirin EC tablet 81 mg, 81 mg, Oral, Daily  amLODIPine (NORVASC) tablet 5 mg, 5 mg, Oral, Daily  sodium chloride flush 0.9 % injection 10 mL, 10 mL, IntraVENous, 2 times per day  sodium chloride flush 0.9 % injection 10 mL, 10 mL, IntraVENous, PRN  0.9 % sodium chloride infusion, 25 mL, IntraVENous, PRN  ondansetron (ZOFRAN) injection 4 mg, 4 mg, IntraVENous, Q4H PRN  polyethylene glycol (GLYCOLAX) packet 17 g, 17 g, Oral, Daily PRN  acetaminophen (TYLENOL) tablet 650 mg, 650 mg, Oral, Q4H PRN **OR** acetaminophen (TYLENOL) suppository 650 mg, 650 mg, Rectal, Q4H PRN  enoxaparin (LOVENOX) injection 40 mg, 40 mg, SubCUTAneous, Daily  [COMPLETED] regadenoson (LEXISCAN) injection 0.4 mg, 0.4 mg, IntraVENous, ONCE PRN  technetium tetrofosmin (Tc-MYOVIEW) injection 30 millicurie, 30 millicurie, IntraVENous, ONCE PRN      Labs:   Recent Labs     02/02/22 1619 02/03/22  0635   WBC 9.7 6.9   HGB 14.5 12.7   HCT 42.0 38.0    293     Recent Labs     02/02/22  1619 02/02/22  1619 02/03/22  0635     --  141   K 3.3*  --  3.7   CO2 25  --  26   BUN 19  --  16   CREATININE 0.6  --  0.6   GLUCOSE 127*   < > 111*    < > = values in this interval not displayed. No results for input(s): BNP in the last 72 hours. Recent Labs     02/02/22 1619   PROTIME 10.6   INR 0.94     No results for input(s): APTT in the last 72 hours. Recent Labs     02/02/22  1619 02/03/22  0142 02/03/22  0635   TROPONINI <0.01 <0.01 <0.01     Lab Results   Component Value Date    HDL 96 01/17/2022    LDLCALC 131 01/17/2022    TRIG 155 01/17/2022     No results for input(s): AST, ALT, LABALBU in the last 72 hours.       EKG:   LBBB    Stress Nuclear:  Negative nuclear scan  EF 80%      ASSESSMENT AND PLAN:      Patient presenting with nausea and fluttering in her chest  No history of chest pain  EKG shows sinus rhythm with left bundle branch block  Troponins are negative  Stress nuclear scan is negative for ischemia  Normal EF      No further work-up planned  May discharge home      Watson Farias M.D  2/3/2022

## 2022-02-03 NOTE — H&P
Hospital Medicine  History and Physical    PCP: Tova Aldana MD  Patient Name: Gerhardt Beal    Date of Service: Pt seen/examined on 2/2/22 and placed in observation. CHIEF COMPLAINT:  Pt c/o nausea, a rapid heart rate and elevated blood pressure. She was seen by her PCP and found to have a new LBBB on EKG  HISTORY OF PRESENT ILLNESS: Pt is an 77y.o. year-old female with a history of hypertension, hyperlipidemia, hypothyroidism GERD and breast cancer. She presents to the emergency room for evaluation of a rapid heart rate and elevated blood pressure. She states that she, \"just does not feel right. \" She states that her symptoms began about 1 or 2 hours after awakening this morning. She notes that she began to feel nauseated, and took her blood pressure on her home equipment. Her heart rate was running in the 100-110's, and her blood pressure was 156/89. She reports that she just felt poorly in general.  In the emergency room her EKG revealed a new left bundle branch block, and her initial troponin was not elevated. She will be admitted for further evaluation and treatment. Associated signs and symptoms do not include chest pain, shortness of breath, diaphoresis, edema, orthopnea, paroxysmal nocturnal dyspnea, fever or chills.       Past Medical History:        Diagnosis Date    Allergic rhinitis     Cancer ([facility] [address]     lumpectomy 11-18    Diverticulitis     Diverticulitis 3/1/16    GERD (gastroesophageal reflux disease)     Hearing loss     Hyperlipidemia     Hypertension     Osteoarthritis     Pancreatic mass tail       Past Surgical History:        Procedure Laterality Date    BLADDER SUSPENSION  2007    COLONOSCOPY  1/2012    polyp-ghastine    HYSTERECTOMY  2007    HYSTERECTOMY, TOTAL ABDOMINAL         Allergies:  Azithromycin, Crestor [rosuvastatin], Lipitor [atorvastatin], and Pravastatin    Medications Prior to Admission:    Prior to Admission medications    Medication Sig Start Date End Date Taking? Authorizing Provider   Multiple Vitamins-Minerals (THERAPEUTIC MULTIVITAMIN-MINERALS) tablet Take 1 tablet by mouth daily   Yes Historical Provider, MD   vitamin C (ASCORBIC ACID) 500 MG tablet Take 500 mg by mouth daily   Yes Historical Provider, MD   pitavastatin (LIVALO) 2 MG TABS tablet TAKE 1 TABLET EVERY  DAY. 1/20/22  Yes Dustin Mcknight MD   levothyroxine (SYNTHROID) 50 MCG tablet TAKE ONE TABLET BY MOUTH DAILY 1/20/22  Yes Dustin Mcknight MD   vitamin D (ERGOCALCIFEROL) 1.25 MG (27205 UT) CAPS capsule Once caps every 2 weeks 1/20/22  Yes Dustin Mcknight MD   triamterene-hydroCHLOROthiazide (NQIDEXQ-36) 37.5-25 MG per tablet TAKE ONE TABLET BY MOUTH EVERY DAY 1/10/22  Yes Jeffrey Villa MD   alendronate (FOSAMAX) 70 MG tablet Take 1 tablet by mouth every 7 days Take once per week in the morning with a full glass of water, on an empty stomach, and do not take anything else by mouth or lie down for the next 30 minutes. 10/5/21  Yes Dustin Mcknight MD   omeprazole (PRILOSEC) 20 MG delayed release capsule TAKE ONE CAPSULE BY MOUTH EVERY DAY 9/27/21  Yes Jeffrey Villa MD   amLODIPine (NORVASC) 5 MG tablet TAKE ONE TABLET BY MOUTH EVERY DAY 7/9/21  Yes Jeffrey Villa MD   letrozole [facility]) 2.5 MG tablet Take 2.5 mg by mouth daily   Yes Historical Provider, MD   aspirin 81 MG EC tablet Take 81 mg by mouth daily    Yes Historical Provider, MD       Family History:       Problem Relation Age of Onset    Stroke Father      Social History:   TOBACCO:   reports that she quit smoking about 20 years ago. She has never used smokeless tobacco.  ETOH:   reports current alcohol use.   OCCUPATION:      REVIEW OF SYSTEMS:  A full review of systems was performed and is negative except for that which appears in the HPI    Physical Exam:    Vitals: /66   Pulse 89   Temp 98.5 °F (36.9 °C) (Oral)   Resp 19   Ht 5' 2\" (1.575 m)   Wt 148 lb 5.9 oz (67.3 kg)   SpO2 97%   BMI 27.14 kg/m²   General appearance: WD/WN 77y.o. year-old female who is alert, appears stated age and is cooperative  HEENT: Head: Normocephalic, no lesions, without obvious abnormality. Eye: Normal external eye, conjunctiva, lids cornea, PEERL. Ears: Normal external ears. Non-tender. Nose: Normal external nose, mucus membranes and septum. Pharynx: Dental Hygiene adequate. Normal buccal mucosa. Normal pharynx. Neck: no adenopathy, no carotid bruit, no JVD, supple, symmetrical, trachea midline and thyroid not enlarged, symmetric, no tenderness/mass/nodules  Lungs: clear to auscultation bilaterally and no use of accessory muscles  Heart: regular rate and rhythm, S1, S2 normal, no murmur, click, rub or gallop and normal apical impulse  Abdomen: soft, non-tender; bowel sounds normal; no masses, no organomegaly  Extremities: extremities atraumatic, no cyanosis or edema and Homans sign is negative, no sign of DVT. Capillary Refill: Acceptable < 3 seconds   Peripheral Pulses: +3 easily felt, not easily obliterated with pressures   Skin: Skin color, texture, turgor normal. No rashes or lesions on exposed skin  Neurologic: Neurovascularly intact without any focal sensory/motor deficits. Cranial nerves: II-XII intact, grossly non-focal. Gait was not tested.   Mental Status: Alert and oriented, thought content appropriate, normal insight    CBC:   Recent Labs     02/02/22  1619   WBC 9.7   HGB 14.5        BMP:    Recent Labs     02/02/22  1619      K 3.3*   CL 98*   CO2 25   BUN 19   CREATININE 0.6   GLUCOSE 127*     Troponin:   Recent Labs     02/02/22  1619   TROPONINI <0.01     PT/INR:  No results found for: PTINR  U/A:    Lab Results   Component Value Date    LEUKOCYTESUR small 07/30/2019    LEUKOCYTESUR Negative 08/11/2014    NITRITE neg 07/30/2019    SPECGRAV 1.010 07/30/2019    SPECGRAV 1.010 08/11/2014    UROBILINOGEN 0.2 08/11/2014    BILIRUBINUR neg 07/30/2019    BLOODU moderate 07/30/2019 BLOODU Negative 08/11/2014    GLUCOSEU neg 07/30/2019    GLUCOSEU Negative 08/11/2014    PROTEINU neg 07/30/2019    PROTEINU Negative 08/11/2014         RAD:   I have independently reviewed and interpreted the imaging studies below and based my recommendations to the patient on those findings. XR CHEST (2 VW)    Result Date: 2/2/2022  EXAMINATION: TWO XRAY VIEWS OF THE CHEST 2/2/2022 4:08 pm COMPARISON: 04/23/2020 HISTORY: ORDERING SYSTEM PROVIDED HISTORY: Chest pain TECHNOLOGIST PROVIDED HISTORY: Reason for exam:->Chest pain Reason for Exam: Chest pain FINDINGS: The lungs are without acute focal process. There is no effusion or pneumothorax. The cardiomediastinal silhouette is stable. The osseous structures are stable. No acute process. [facility] RIDGE DIGITAL SCREEN BILATERAL    Result Date: 1/27/2022  EXAMINATION: SCREENING DIGITAL BILATERAL  MAMMOGRAM WITH TOMOSYNTHESIS, 1/27/2022 TECHNIQUE: Screening mammography was performed with tomosynthesis including MLO and CC views of the bilateral breasts. Computer aided detection was used for the interpretation of this exam. COMPARISON: 2015 2016 2018 2019 2020 HISTORY: Screening. FINDINGS: The breasts are heterogenously dense, which may obscure small masses. There are no suspicious abnormalities. There has been no significant interval change. No definite mammographic evidence of malignancy. BIRADS: BIRADS - CATEGORY 1 Negative, no evidence of malignancy. Normal interval follow-up is recommended in 12 months. OVERALL ASSESSMENT - NEGATIVE A letter of notification will be sent to the patient regarding the results. The [facility] of Radiology recommend annual mammograms for women 40 years and older.          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 *      Plan:       Chest pain with new onset left bundle branch block (LBBB) - Patient will be admitted and monitored on telemetry, and we will check serial cardiac enzymes. Aspirin was started in the Emergency Room. Cardiac testing has been ordered for risk stratification. I have discussed other possible etiologies of the symptoms such as Musculoskeletal Pain and GERD, including unique presenting characteritics of each. Patient understands that if the evaluation does not show that the symptoms are secondary to ischemic changes, she will be discharged and instructed to follow up with her outpatient physician to help determine the etiology of her symptoms. Hypokalemia - replace Potassium and recheck in the am    Acquired hypothyroidism - stable; continue Levothyroxine    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, estrogen receptor negative ([facility] [address] -continue Letrozole    Essential (primary) hypertension - continue home meds and monitor blood pressure    Hyperlipidemia - No current evidence of Rhabdomyolysis or other adverse effects. Continue statin therapy while in the hospital        DVT Prophylaxis: Lovenox  Diet: NPO  Code Status: Full code  (Advanced care planning has been discussed with patient and/or responsible family member and is reflected in the code status.  Further orders associated with this have been entered if appropriate)      Disposition: Anticipate that patient will remain in the hospital for 1 to 2 days depending on further evaluation and clinical course    Please note that over 50 minutes was spent in evaluating the patient, review of records and results, discussion with staff/family, etc.    Estefanía Pickett, MD

## 2022-02-04 ENCOUNTER — CARE COORDINATION (OUTPATIENT)
Dept: CASE MANAGEMENT | Age: 77
End: 2022-02-04

## 2022-02-04 NOTE — CARE COORDINATION
[facility] Transitions Initial Follow Up Call    Call within 2 business days of discharge: Yes    Patient: Kayce Richmond Patient : 1945   MRN: [id number]  Reason for Admission: Chest Pains   Discharge Date: 2/3/22 RARS: No data recorded    Last Discharge [address] [address]       Complaint Diagnosis Description Type Department Provider    22 Nausea Chest pain, unspecified type . .. ED to Hosp-Admission (Discharged) (ADMITTED) [facility] Alaina Mathews DO; [place] Patient . .. Spoke with: [facility] attempted outreach 24 hr hospital discharge for care transition follow up. Left HIPPA compliant message and contact information for call back. Facility: [facility] [address] Transitions 24 Hour Call    Care Transitions Interventions         Follow Up  No future appointments.     Yuan Murugia LPN

## 2022-02-07 ENCOUNTER — CARE COORDINATION (OUTPATIENT)
Dept: CASE MANAGEMENT | Age: 77
End: 2022-02-07

## 2022-02-07 NOTE — CARE COORDINATION
Transitions of Care Call  Call within 2 business days of discharge: Yes    Patient: Kayce Richmond Patient : 1945   MRN: [id number]  Reason for Admission: Chest pain  Discharge Date: 2/3/22 RARS: No data recorded    Last Discharge [facility]       Complaint Diagnosis Description Type Department Provider    22 Nausea Chest pain, unspecified type . .. ED to Hosp-Admission (Discharged) (ADMITTED) [facility]W Alaina Mathews DO; [place]if[place] Patient . .. Was this an external facility discharge? No Discharge Facility: n/a    Challenges to be reviewed by the provider   Additional needs identified to be addressed with provider: No  none                 Encounter was routed to provider for escalation. Method of communication with provider: chart routing. Discussed COVID-19 related testing which was: available at this time. Test results were: negative. Patient informed of results, if available? Yes. Current Symptoms: no new symptoms and no worsening symptoms    Reviewed New or Changed Meds: no new/changed medications    Do you have what you need at home?  Durable Medical Equipment ordered at discharge: None   Home Health/Outpatient orders at discharge: none   Was patient discharged with a pulse oximeter? No Discussed and confirmed pulse oximeter discharge instructions and when to notify provider or seek emergency care. Patient education provided: Reviewed appropriate site of care based on symptoms and resources available to patient including: PCP and Specialist.     Follow up appointment scheduled within 7 days of discharge: no. If no appointment scheduled, scheduling offered: yes  No future appointments. Interventions: Obtained and reviewed discharge summary and/or continuity of care documents  Reviewed discharge instructions, medical action plan and red flags with patient who verbalized understanding. No further follow-up call indicated based on severity of symptoms and risk factors.   Plan for next call: n/a  Provided contact information for future needs. Pt states she is doing well today. Denies any further symptoms of nausea, high blood pressure or racing heart. States she has no chest tightness or pain. Denies sob, fever, N/V/D. States she is able to ambulate without issues. No changes to medications since discharge. Pt will be making a follow up with PCP and cardiology and does not need help making these. Would not like any further follow up at this time. Will resolve episode and remain available.        FAUSTINO Salgado, RN   Care Transition Nurse  Mobile: [phone]

## 2022-02-09 ENCOUNTER — TELEPHONE (OUTPATIENT)
Dept: ENDOCRINOLOGY | Age: 77
End: 2022-02-09

## 2022-02-09 NOTE — TELEPHONE ENCOUNTER
Submitted PA for Livalo 2MG tablets Key: [id number]    Via CMM STATUS:   Drug is covered by current benefit plan.  No further PA activity needed

## 2022-02-25 ENCOUNTER — HOSPITAL ENCOUNTER (OUTPATIENT)
Dept: CT IMAGING | Age: 77
Discharge: HOME | End: 2022-02-25
Payer: MEDICARE

## 2022-02-25 ENCOUNTER — HOSPITAL ENCOUNTER (OUTPATIENT)
Dept: ULTRASOUND IMAGING | Age: 77
Discharge: HOME | End: 2022-02-25
Payer: MEDICARE

## 2022-02-25 DIAGNOSIS — R07.9 CHEST PAIN, UNSPECIFIED TYPE: ICD-10-CM

## 2022-02-25 DIAGNOSIS — R11.0 NAUSEA: ICD-10-CM

## 2022-02-25 PROCEDURE — 71250 CT THORAX DX C-: CPT

## 2022-02-25 PROCEDURE — 76705 ECHO EXAM OF ABDOMEN: CPT

## 2022-03-08 PROBLEM — E87.6 HYPOKALEMIA: Status: RESOLVED | Noted: 2022-02-03 | Resolved: 2022-03-08

## 2022-03-17 NOTE — PROGRESS NOTES
[facility]  Cardiology Note      Emmie Wong  1945, 77 y.o.      CC: \" I feel well. \"                 Myles Chaidez MD:      HPI:   This is a 77 y.o. female with a hx of LBBB, hyperlipidemia and hypertension. Normal coronary arteries on [facility] in  ([facility]). She was seen by a nurse practitioner at her PCPs office on 22 for tachycardia and nausea. She was instructed to proceed to the ED for further evaluation. She reported waking up with nausea, chest pain and BP was elevated. New LBBB noted on EKG and troponins were negative. Stress nuclear was negative for ischemia with normal EF. She was discharged home and followed up with Dr. Anushka Skelton on 22 when he referred her back to cardiology for evaluation of chest pain and LBBB. Today, she is here and states she feels well. Says Dr. Anushka Skelton wanted her to be seen again for LBBB. She is able to participate in ADLs with no chest pain, palpitations or shortness of breath. She reports compliance with medial therapy and tolerating. Denies any abnormal bruising or bleeding and no myalgias.        Past Medical History:   Diagnosis Date    Allergic rhinitis     Cancer ([address]     lumpectomy     Diverticulitis     Diverticulitis 3/1/16    GERD (gastroesophageal reflux disease)     Hearing loss     Hyperlipidemia     Hypertension     Left bundle branch block     Osteoarthritis     Pancreatic mass tail      Past Surgical History:   Procedure Laterality Date    BLADDER SUSPENSION      COLONOSCOPY  2012    polyp-ghastine    HYSTERECTOMY  2007    HYSTERECTOMY, TOTAL ABDOMINAL        Family History   Problem Relation Age of Onset    Stroke Father       Social History     Tobacco Use    Smoking status: Former Smoker     Packs/day: 1.00     Years: 20.00     Pack years: 20.00     Quit date: 2001     Years since quittin.3    Smokeless tobacco: Never Used   Vaping Use    Vaping Use: Not on file   Substance Use Topics    Alcohol use: Yes     Comment: socially    Drug use: No     Allergies   Allergen Reactions    Azithromycin Nausea Only     Other reaction(s): Muscle Aches    Crestor [Rosuvastatin] Diarrhea     Leg cramps     Lipitor [Atorvastatin]      Leg cramps     Pravastatin Other (See Comments)     Muscle aches and leg cramps         Review of Systems -   Constitutional: Negative for weight gain/loss; malaise, fever  Respiratory: Negative for Asthma;  cough and hemoptysis  Cardiovascular: Negative for palpitations,dizziness   Gastrointestinal: Negative for abd.pain; constipation/diarrhea;    Genitourinary: Negative for stones; hematuria; frequency hesitancy  Integumentt: Negative for rash or pruritis  Hematologic/lymphatic: Negative for blood dyscrasia; leukemia/lymphoma  Musculoskeletal: Negative for Connective tissue disease  Neurological:  Negative for Seizure   Behavioral/Psych:Negative for Bipolar disorder, Schizophrenia; Dementia  Endocrine: negative for thyroid, parathyroid disease    Physical Examination:    /80   Pulse 84   Ht 5' 2\" (1.575 m)   Wt 146 lb 6.4 oz (66.4 kg)   SpO2 99%   BMI 26.78 kg/m²    HEENT:  Face: Atraumatic, Conjunctiva: Pink; non icteric,  Mucous Memb:  Moist, No thyromegaly or Lymphadenopathy  Respiratory:  Resp Assessment: normal, Resp Auscultation: clear   Cardiovascular: Auscultation: nl S1 & S2, Palpation:  Nl PMI;  No heaves or thrills, JVP:  normal  Abdomen: Soft, non-tender, Normal bowel sounds,  No organomegaly  Extremities: No Cyanosis or Clubbing  Neurological: Oriented to time, place, and person, Non-anxious  Psychiatric: Normal mood and affect  Skin: Warm and dry,  No rash seen     Outpatient Medications Marked as Taking for the 3/23/22 encounter (Office Visit) with Sean Santamaria MD   Medication Sig Dispense Refill    omeprazole (PRILOSEC) 20 MG delayed release capsule TAKE ONE CAPSULE BY MOUTH EVERY DAY 30 capsule 5    Multiple Vitamins-Minerals (THERAPEUTIC MULTIVITAMIN-MINERALS) tablet Take 1 tablet by mouth daily      vitamin C (ASCORBIC ACID) 500 MG tablet Take 500 mg by mouth daily      pitavastatin (LIVALO) 2 MG TABS tablet TAKE 1 TABLET EVERY  DAY. 90 tablet 3    levothyroxine (SYNTHROID) 50 MCG tablet TAKE ONE TABLET BY MOUTH DAILY 90 tablet 3    vitamin D (ERGOCALCIFEROL) 1.25 MG (56388 UT) CAPS capsule Once caps every 2 weeks 7 capsule 3    triamterene-hydroCHLOROthiazide (MAXZIDE-25) 37.5-25 MG per tablet TAKE ONE TABLET BY MOUTH EVERY DAY 90 tablet 1    alendronate (FOSAMAX) 70 MG tablet Take 1 tablet by mouth every 7 days Take once per week in the morning with a full glass of water, on an empty stomach, and do not take anything else by mouth or lie down for the next 30 minutes. 13 tablet 3    amLODIPine (NORVASC) 5 MG tablet TAKE ONE TABLET BY MOUTH EVERY DAY 90 tablet 2    letrozole (FEMARA) 2.5 MG tablet Take 2.5 mg by mouth daily      aspirin 81 MG EC tablet Take 81 mg by mouth daily            Labs:   Lab Results   Component Value Date    HDL 96 01/17/2022    LDLCALC 131 01/17/2022    TRIG 155 01/17/2022       EKG: 3/23/22 Sinus Rhythm    LHC: 2/2006     LEFT MAIN:  The LMCA is a medium caliber vessel that bifurcates into an LAD and left   circumflex artery.  There are no angiographic lesions identified in the left main.          LEFT ANTERIOR DESCENDING:  The LAD is a medium caliber vessel that wraps around the apex. Two diagonal branches are present. There are no lesions identified in the LAD/diagonal   system.          LEFT CIRCUMFLEX:  The left circumflex is a medium caliber vessel that provides one   branching obtuse marginal and continues in the AV groove. No lesions are identified in the   circumflex/obtuse marginal system.          RIGHT CORONARY ARTERY:  The RCA is a medium caliber dominant vessel that provides a PDA.    No lesions are identified in the RCA/PDA system.          LEFT VENTRICULOGRAM/PULLBACK:  The LV gram was performed in the PALENCIA 30 degree position and   reveals normal LV contractility. Estimated ejection fraction is greater then 55%. There is   no gradient across the aortic valve on pullback.          AORTOGRAPHY:  Aortography was performed in the Thai 30 degree position and reveals normal   appearing aortic root size.          CONCLUSIONS:     Normal LV contractility.      Normal coronary arteries.     False positive nuclear stress test.       ECHO: 8/2014  Normal left ventricle size, wall thickness and hyperkinetic systolic  function with an estimated ejection fraction of 65-70%. Mild aortic regurgitation is present. Trivial mitral regurgitation is present. There is trivial tricuspid regurgitation with RVSP estimated at 25 mmHg. Stress Nuclear: 2/3/22  Summary    Normal myocardial perfusion study.    Normal myocardial perfusion.    Normal LV size and systolic function.    Overall findings represent a low risk study       ASSESSMENT AND PLAN:    LBBB  Has normal myocardial perfusion scan and ejection fraction    Essential hypertension  BP is controlled   Continue medical management     Mixed hyperlipidemia   Managed by PCP   Reviewed lipid panel completed on 7/13/21; Has history of high LDLs but so is the HDL in the 90s  No coronary disease when she underwent coronary angiogram 15 years ago  Current lipid panel is satisfactory to me given her high HDL  Myalgias with other statins   Continue Livalo     Return to the office on an as needed basis. Thank you very much for allowing me to participate in the care of your patient. Please do not hesitate to contact me if you have any questions.       Sincerely,    Martín Walls M.D  [facility], [address] [place] [address]  Ph: [phone]  Fax: [phone]    This note was scribed in the presence of Dr. Martín Walls MD by Ruth Booth RN  Physician Attestation:  The scribes documentation has been prepared under my direction and personally reviewed by me in its entirety. I confirm that the note above accurately reflects all work, treatment, procedures, and medical decision making performed by me.

## 2022-03-23 ENCOUNTER — OFFICE VISIT (OUTPATIENT)
Dept: CARDIOLOGY CLINIC | Age: 77
End: 2022-03-23
Payer: MEDICARE

## 2022-03-23 VITALS
WEIGHT: 146.4 LBS | SYSTOLIC BLOOD PRESSURE: 128 MMHG | BODY MASS INDEX: 26.94 KG/M2 | DIASTOLIC BLOOD PRESSURE: 80 MMHG | HEIGHT: 62 IN | HEART RATE: 84 BPM | OXYGEN SATURATION: 99 %

## 2022-03-23 DIAGNOSIS — E78.2 MIXED HYPERLIPIDEMIA: ICD-10-CM

## 2022-03-23 DIAGNOSIS — I10 ESSENTIAL HYPERTENSION: ICD-10-CM

## 2022-03-23 DIAGNOSIS — R07.9 CHEST PAIN, UNSPECIFIED TYPE: Primary | ICD-10-CM

## 2022-03-23 DIAGNOSIS — I44.7 LBBB (LEFT BUNDLE BRANCH BLOCK): ICD-10-CM

## 2022-03-23 PROCEDURE — 93000 ELECTROCARDIOGRAM COMPLETE: CPT | Performed by: INTERNAL MEDICINE

## 2022-03-23 PROCEDURE — 99214 OFFICE O/P EST MOD 30 MIN: CPT | Performed by: INTERNAL MEDICINE

## 2022-03-23 NOTE — PATIENT INSTRUCTIONS
Patient Education        Heart Blocks: Care Instructions  Your Care Instructions     A heart block is a problem with your heart's electrical system. Normally, a small area of the heart (sinus node) creates the electrical signals that cause the heart to beat in a timed and regular way. A heart block occurs when the signal is blocked. This disrupts the heartbeat. A heart block does not mean that blood flow to the heart is blocked. Heart block can be caused by many things that affect the electrical system of the heart. These things include the effects of aging, certain medicines, and another health condition. There are three types of heart blocks. In a first-degree heart block, the signal is slower than normal. But the heart rate is normal, and the heart usually is not damaged. In a second-degree heart block, some signals do not reach the lower chambers of the heart. This can cause the heart to skip a beat or have an abnormal rhythm. In a third-degree heart block, the signal is completely blocked from reaching the lower chambers. This can cause the heart to slow down a lot or even stop beating. It is a very serious condition. How heart block is treated can depend on the type and what is causing it. Treatment can also depend on your symptoms. If heart block doesn't cause symptoms, it may not be treated. Treatment may be a pacemaker. You and your doctor can decide what treatment is right for you. Follow-up care is a key part of your treatment and safety. Be sure to make and go to all appointments, and call your doctor if you are having problems. It's also a good idea to know your test results and keep a list of the medicines you take. How can you care for yourself at home? · If you feel lightheaded, sit or lie down to avoid injury that might occur if you faint and fall. · Make lifestyle changes to improve your heart health.   ? Eat a heart-healthy diet that includes vegetables, fruits, nuts, beans, lean meat, fish, and whole grains. Limit alcohol, sodium, and sugar. ? Get regular exercise. Try for 30 minutes on most days of the week. If you do not have other heart problems, you likely do not have limits on the type or level of activity that you can do. You may want to walk, swim, bike, or do other activities. Ask your doctor what level of exercise is safe for you.  ? Stay at a healthy weight. Lose weight if you need to.  ? Do not smoke. If you need help quitting, talk to your doctor about stop-smoking programs and medicines. These can increase your chances of quitting for good. ? Manage other health problems such as high blood pressure, high cholesterol, and diabetes. · If you received a pacemaker or an implantable cardioverter-defibrillator (ICD), you will get more information about it. · Wear medical alert jewelry that describes your condition and says you have a pacemaker or ICD. You can buy this at most drugstores. When should you call for help? Call 911 anytime you think you may need emergency care. For example, call if:    · You passed out (lost consciousness).     · You have symptoms of a heart attack. These may include:  ? Chest pain or pressure, or a strange feeling in the chest.  ? Sweating. ? Shortness of breath. ? Nausea or vomiting. ? Pain, pressure, or a strange feeling in the back, neck, jaw, or upper belly or in one or both shoulders or arms. ? Lightheadedness or sudden weakness. ? A fast or irregular heartbeat. After you call 911, the  may tell you to chew 1 adult-strength or 2 to 4 low-dose aspirin. Wait for an ambulance. Do not try to drive yourself. Call your doctor now or seek immediate medical care if:    · You are dizzy or lightheaded, or you feel like you may faint.     · You have new or increased shortness of breath. Watch closely for changes in your health, and be sure to contact your doctor if you have any problems. Where can you learn more?   Go to [email or web address] and sign in to your [facility] account. Enter I487 in the [place] box to learn more about \"Heart Blocks: Care Instructions. \"     If you do not have an account, please click on the \"Sign Up Now\" link. Current as of: April 29, 2021               Content Version: 13.1  © 3005-6598 Healthwise, [facility]. Care instructions adapted under license by [facility] ([facility]). If you have questions about a medical condition or this instruction, always ask your healthcare professional. [place] [address] any warranty or liability for your use of this information.

## 2022-07-28 DIAGNOSIS — E55.9 VITAMIN D DEFICIENCY: ICD-10-CM

## 2022-07-28 RX ORDER — ERGOCALCIFEROL 1.25 MG/1
CAPSULE ORAL
Qty: 12 CAPSULE | Refills: 3 | Status: SHIPPED | OUTPATIENT
Start: 2022-07-28

## 2022-07-28 NOTE — TELEPHONE ENCOUNTER
Medication:   Requested Prescriptions     Pending Prescriptions Disp Refills    vitamin D (ERGOCALCIFEROL) 1.25 MG (02191 UT) CAPS capsule [Pharmacy Med Name: VIT D2 1.25 MG (50,000 UNIT 73212 Capsule] 12 capsule 3     Sig: TAKE 1 CAPSULE BY MOUTH ONCE A WEEK       Last Filled:      Patient Phone Number: [phone] (home)     Last appt: 1/20/22  Next appt: Visit date not found    Last Labs DM:   Lab Results   Component Value Date/Time    VITD25 62.7 01/17/2022 11:13 AM    VITD25 78.1 07/13/2021 09:57 AM

## 2022-10-24 DIAGNOSIS — E55.9 VITAMIN D DEFICIENCY: ICD-10-CM

## 2022-10-24 DIAGNOSIS — I10 ESSENTIAL HYPERTENSION: ICD-10-CM

## 2022-10-24 DIAGNOSIS — E78.00 PURE HYPERCHOLESTEROLEMIA: ICD-10-CM

## 2022-10-24 LAB
A/G RATIO: 1.7 (ref 1.1–2.2)
ALBUMIN SERPL-MCNC: 4.5 G/DL (ref 3.4–5)
ALP BLD-CCNC: 87 U/L (ref 40–129)
ALT SERPL-CCNC: 20 U/L (ref 10–40)
ANION GAP SERPL CALCULATED.3IONS-SCNC: 13 MMOL/L (ref 3–16)
AST SERPL-CCNC: 24 U/L (ref 15–37)
BILIRUB SERPL-MCNC: 0.4 MG/DL (ref 0–1)
BUN BLDV-MCNC: 15 MG/DL (ref 7–20)
CALCIUM SERPL-MCNC: 10.2 MG/DL (ref 8.3–10.6)
CHLORIDE BLD-SCNC: 100 MMOL/L (ref 99–110)
CHOLESTEROL, TOTAL: 245 MG/DL (ref 0–199)
CO2: 27 MMOL/L (ref 21–32)
CREAT SERPL-MCNC: 0.9 MG/DL (ref 0.6–1.2)
GFR SERPL CREATININE-BSD FRML MDRD: >60 ML/MIN/{1.73_M2}
GLUCOSE BLD-MCNC: 93 MG/DL (ref 70–99)
HDLC SERPL-MCNC: 114 MG/DL (ref 40–60)
LDL CHOLESTEROL CALCULATED: 113 MG/DL
POTASSIUM SERPL-SCNC: 3.7 MMOL/L (ref 3.5–5.1)
SODIUM BLD-SCNC: 140 MMOL/L (ref 136–145)
T4 FREE: 1.1 NG/DL (ref 0.9–1.8)
TOTAL PROTEIN: 7.1 G/DL (ref 6.4–8.2)
TRIGL SERPL-MCNC: 92 MG/DL (ref 0–150)
TSH REFLEX: 4.56 UIU/ML (ref 0.27–4.2)
VITAMIN D 25-HYDROXY: 69.7 NG/ML
VLDLC SERPL CALC-MCNC: 18 MG/DL

## 2022-10-25 LAB
ESTIMATED AVERAGE GLUCOSE: 116.9 MG/DL
HBA1C MFR BLD: 5.7 %

## 2022-12-14 DIAGNOSIS — E03.9 ACQUIRED HYPOTHYROIDISM: Primary | ICD-10-CM

## 2022-12-14 RX ORDER — LEVOTHYROXINE SODIUM 0.05 MG/1
TABLET ORAL
Qty: 90 TABLET | Refills: 3 | Status: SHIPPED | OUTPATIENT
Start: 2022-12-14

## 2022-12-14 NOTE — TELEPHONE ENCOUNTER
Medication:   Requested Prescriptions     Pending Prescriptions Disp Refills    levothyroxine (SYNTHROID) 50 MCG tablet [Pharmacy Med Name: LEVOTHYROXINE SODIUM 50 MCG 50 Tablet] 90 tablet 3     Sig: TAKE ONE TABLET BY MOUTH DAILY       Last Filled:      Patient Phone Number: [phone] (home)     Last appt: 1/20/2022   Next appt: 2/15/23    Last Thyroid:   Lab Results   Component Value Date/Time    TSH 2.45 07/07/2020 09:47 AM    T4FREE 1.1 10/24/2022 10:17 AM    X7YTSZA 9.1 04/28/2016 11:18 AM

## 2023-01-16 DIAGNOSIS — E78.00 PURE HYPERCHOLESTEROLEMIA: ICD-10-CM

## 2023-01-16 RX ORDER — PITAVASTATIN CALCIUM 2.09 MG/1
TABLET, FILM COATED ORAL
Qty: 90 TABLET | Refills: 3 | Status: SHIPPED | OUTPATIENT
Start: 2023-01-16

## 2023-01-16 NOTE — TELEPHONE ENCOUNTER
Medication:   Requested Prescriptions     Pending Prescriptions Disp Refills    pitavastatin (LIVALO) 2 MG TABS tablet [Pharmacy Med Name: Mackenzie Davila 2MG] 90 tablet 3     Sig: TAKE 1 TABLET DAILY       Last Filled:      Patient Phone Number: [phone] (home)     Last appt: 1/20/2022   Next appt: 2/15/22    Last Lipid:   Lab Results   Component Value Date/Time    CHOL 245 10/24/2022 10:17 AM    TRIG 92 10/24/2022 10:17 AM     10/24/2022 10:17 AM    LDLCALC 113 10/24/2022 10:17 AM

## 2023-02-15 ENCOUNTER — OFFICE VISIT (OUTPATIENT)
Dept: ENDOCRINOLOGY | Age: 78
End: 2023-02-15
Payer: MEDICARE

## 2023-02-15 VITALS
BODY MASS INDEX: 27.42 KG/M2 | HEIGHT: 62 IN | WEIGHT: 149 LBS | TEMPERATURE: 98 F | HEART RATE: 80 BPM | RESPIRATION RATE: 14 BRPM | SYSTOLIC BLOOD PRESSURE: 130 MMHG | DIASTOLIC BLOOD PRESSURE: 76 MMHG

## 2023-02-15 DIAGNOSIS — E55.9 VITAMIN D DEFICIENCY: ICD-10-CM

## 2023-02-15 DIAGNOSIS — M81.0 AGE-RELATED OSTEOPOROSIS WITHOUT CURRENT PATHOLOGICAL FRACTURE: Primary | ICD-10-CM

## 2023-02-15 DIAGNOSIS — E03.9 ACQUIRED HYPOTHYROIDISM: ICD-10-CM

## 2023-02-15 PROCEDURE — 1123F ACP DISCUSS/DSCN MKR DOCD: CPT | Performed by: INTERNAL MEDICINE

## 2023-02-15 PROCEDURE — 3078F DIAST BP <80 MM HG: CPT | Performed by: INTERNAL MEDICINE

## 2023-02-15 PROCEDURE — 99214 OFFICE O/P EST MOD 30 MIN: CPT | Performed by: INTERNAL MEDICINE

## 2023-02-15 PROCEDURE — 3075F SYST BP GE 130 - 139MM HG: CPT | Performed by: INTERNAL MEDICINE

## 2023-02-15 RX ORDER — LEVOTHYROXINE SODIUM 0.05 MG/1
TABLET ORAL
Qty: 90 TABLET | Refills: 3 | Status: SHIPPED | OUTPATIENT
Start: 2023-02-15

## 2023-02-15 RX ORDER — ERGOCALCIFEROL 1.25 MG/1
CAPSULE ORAL
Qty: 6 CAPSULE | Refills: 3 | Status: SHIPPED | OUTPATIENT
Start: 2023-02-15

## 2023-02-15 NOTE — PROGRESS NOTES
This 77 yrs old female is here for management of hyperlipidemia, vit D def, hypothyroidism, impaired fasting    She was seeing Dr. Nolan Negron    Interim:    Juni Sierra  Every day      Hyperlipidemia :   She has h/o hypercholesterolemia for years    She took lipitor 40mg then started experiencing myalgias in upper legs, especially at night. Then took Crestor 10mg which reproduced the symptoms. She was started on vit D  twice a week and was given crestor 20 mg daily with it. She tolerated it initially but then started having muscle pains and stopped it. Could not tolerate Pravastatin in 02/18     She is currently on Livalo 2 mg every other day. She is able to tolerate Livalo. No significant muscle aches     Mild, controlled, no worsening factors    ----> 154--->90---> 117---> 115---> 158---> 114---> 127--> 138---> 113  --> 114--> 95---> 85---> 102---> 91--> 95  TGD 61---> 88---> 87----> 124---> 100---> 102---> 116  TC  337--->  227---> 268---> 236--> 238 ---> 256---> 245    She does exercise    1/21       7/21     1/22      Hypothyroidism : She has hypothyroidism . On levothyroxine 50 mcg daily. For years, stable    On levothyroxine 50 mg daily. TSH 3.15 ---> 3.46---> 4.46---> 2.65---> 2.45---> 2.75    Vitamin D def : She is on vitamin D 50,000 IU weekly. She was on 50,000 IU twice a week. Dose was adjusted as her levels were high normal.     Vitamin D 85.1---> 62.8---> 73.5---> 68---> 78.1---> 62      Impaired fasting glucose :  Glucose 104---> 100---> 103---> 101---> 101---> 92---> 89  She has h/o impaired fasting glucose  No significant FH of DM    Weight has been stable. She is on diet management.      She has osteopenia, follows with Dr. Lasha Santamaria    Records from Dr. Robert Falk and Dr. Nolan Negron,  labs reviewed    She has osteopenia, followed by Dr. Lasha Santamaria, they would like us to follow    Diet Ca 600mg   She has HTN, on norvasc and triamterene/HCTZ  Stable    7/21  LUMBAR SPINE:       The bone mineral density in the lumbar spine including the L1-L4 levels is   measured at 0.98 g/cm2, which corresponds to a T-score of -0.6 and a Z-score   of 1.8. This is within the normal range by WHO criteria. No great change from the comparison       RIGHT HIP:       The bone mineral density in the total hip is measured at 0.77 g/cm2   corresponding to a T-score of -1.4 and a Z-score of 0.4. This is within the   osteopenia range by WHO criteria. The bone mineral density of the femoral neck is measured at 0.56 g/cm2   corresponding to a T-score of -2.6 and a Z-score of -0.5. This is within the   osteoporosis range by WHO criteria. Not on treatment    No previous fracture    Fosamax started 10.21    FH: Dad 73 years had CVA  Not sure of hyperlipidemia  No siblings    SH: Quit smoking 2002  31 ys 5 cig/day no drugs    Past Medical History:   Diagnosis Date    Allergic rhinitis     Cancer ([facility] [address]     lumpectomy 11-18    Diverticulitis     Diverticulitis 3/1/16    GERD (gastroesophageal reflux disease)     Hearing loss     Hyperlipidemia     Hypertension     Left bundle branch block     Osteoarthritis     Pancreatic mass tail     Past Surgical History:   Procedure Laterality Date    BLADDER SUSPENSION  2007    COLONOSCOPY  1/2012    polyp-ghastine    HYSTERECTOMY (CERVIX STATUS UNKNOWN)  2007    HYSTERECTOMY, TOTAL ABDOMINAL (CERVIX REMOVED)         Review of Systems   Scanned, reviewed    There were no vitals filed for this visit. There were no vitals filed for this visit. Constitutional: Well-developed, appears stated age, cooperative, in no acute distress  H/E/N/M/T:atraumatic, normocephalic, external ears, nose, lips normal without lesions  Eyes: Lids, lashes, conjunctivae and sclerae normal, No proptosis, no redness  Neck: supple, symmetrical, no swelling  Skin: No obvious rashes or lesions present.   Skin and hair texture normal  Psychiatric: Judgement and Insight:  judgement and insight appear normal  Neuro: Normal without focal findings, speech is normal normal, speech is spontaneous  Chest: No labored breathing, no chest deformity, no stridor  Musculoskeletal: No joint deformity, swelling      Orders Only on 10/24/2022   Component Date Value Ref Range Status    Sodium 10/24/2022 140  136 - 145 mmol/L Final    Potassium 10/24/2022 3.7  3.5 - 5.1 mmol/L Final    Chloride 10/24/2022 100  99 - 110 mmol/L Final    CO2 10/24/2022 27  21 - 32 mmol/L Final    Anion Gap 10/24/2022 13  3 - 16 Final    Glucose 10/24/2022 93  70 - 99 mg/dL Final    BUN 10/24/2022 15  7 - 20 mg/dL Final    Creatinine 10/24/2022 0.9  0.6 - 1.2 mg/dL Final    Est, Glom Filt Rate 10/24/2022 >60  >60 Final    Comment: Pediatric calculator link  [email or web address]/kdoqi/gfr_[email or web address]  Effective Oct 3, 2022  These results are not intended for use in patients  <25years of age. eGFR results are calculated without  a race factor using the 2021 CKD-EPI equation. Careful  clinical correlation is recommended, particularly when  comparing to results calculated using previous equations. The CKD-EPI equation is less accurate in patients with  extremes of muscle mass, extra-renal metabolism of  creatinine, excessive creatinine ingestion, or following  therapy that affects renal tubular secretion. Calcium 10/24/2022 10.2  8.3 - 10.6 mg/dL Final    Total Protein 10/24/2022 7.1  6.4 - 8.2 g/dL Final    Albumin 10/24/2022 4.5  3.4 - 5.0 g/dL Final    Albumin/Globulin Ratio 10/24/2022 1.7  1.1 - 2.2 Final    Total Bilirubin 10/24/2022 0.4  0.0 - 1.0 mg/dL Final    Alkaline Phosphatase 10/24/2022 87  40 - 129 U/L Final    ALT 10/24/2022 20  10 - 40 U/L Final    AST 10/24/2022 24  15 - 37 U/L Final    Vit D, 25-Hydroxy 10/24/2022 69.7  >=30 ng/mL Final    Comment: <=20 ng/mL. ........... Erleen Gerard Deficient  21-29 ng/mL. ......... Erleen Gerard Insufficient  >=30 ng/mL......... Yamileth Chatterjee Sufficient      TSH 10/24/2022 4.56 (A)  0.27 - 4.20 uIU/mL Final    Hemoglobin A1C 10/24/2022 5.7  See comment % Final    Comment: Comment:  Diagnosis of Diabetes: > or = 6.5%  Increased risk of diabetes (Prediabetes): 5.7-6.4%  Glycemic Control: Nonpregnant Adults: <7.0%                    Pregnant: <6.0%        eAG 10/24/2022 116.9  mg/dL Final    Cholesterol, Total 10/24/2022 245 (A)  0 - 199 mg/dL Final    Triglycerides 10/24/2022 92  0 - 150 mg/dL Final    HDL 10/24/2022 114 (A)  40 - 60 mg/dL Final    Comment: An HDL cholesterol less than 40 mg/dL is low and  constitutes a coronary heart disease risk factor. An HDL cholesterol greater than 60 mg/dL is a  negative risk factor for coronary heart disease. LDL Calculated 10/24/2022 113 (A)  <100 mg/dL Final    VLDL Cholesterol Calculated 10/24/2022 18  Not Established mg/dL Final    T4 Free 10/24/2022 1.1  0.9 - 1.8 ng/dL Final       Assessment:     1. Hyperlipidemia: This 77 yrs old female has h/o hyperlipidemia. She was not able to tolerate lipitor, Crestor, pravastatin   On livalo 2 mg 6 day/ week . Tolerating now. Advised can try Daily  No h/o CAD or CVA. May consider Zetia, welchol or PCKS-9 inhibitor if needed  Total cholesterol high likely due to high HDL    2. Hypothyroidism: On levothyroxine, TSH slightly elevate  Advised to take every day    3. Vit  D deficiency: On weekly supplement, monitor. She was on twice a week prior to it, level 78---> 68  Change to every 2 weeks    4. Imparied fasting glucose: Normal A1c. Recommend CHO restriction and weight loss    5. Beast ca. As per oncology. 6.HTN: Stable    7. Osteoporosis : Follows Dr. Damion Shelby, also on Femara. Recommend Ca supplement 500mg daily. Dexa 7/21 shows stable BMD but osteoporosis , T score -2.6 at hip. Discussed Tx with fosamax, risk of side effects. Atypical femoral fracture and ONJ, she is willing to start  On it since 10.21     Plan:     1.livalo  daily  2. Lipid in 6 months  3. TSH in 6 months  4. Fosamax  5. Dexa before next visit

## 2023-02-16 ENCOUNTER — HOSPITAL ENCOUNTER (OUTPATIENT)
Dept: WOMENS IMAGING | Age: 78
Discharge: HOME | End: 2023-02-16
Payer: MEDICARE

## 2023-02-16 DIAGNOSIS — Z12.31 BREAST CANCER SCREENING BY MAMMOGRAM: ICD-10-CM

## 2023-02-16 PROCEDURE — 77063 BREAST TOMOSYNTHESIS BI: CPT

## 2023-04-24 DIAGNOSIS — E78.00 PURE HYPERCHOLESTEROLEMIA: ICD-10-CM

## 2023-04-24 RX ORDER — PITAVASTATIN CALCIUM 2.09 MG/1
TABLET, FILM COATED ORAL
Qty: 90 TABLET | Refills: 3 | Status: SHIPPED | OUTPATIENT
Start: 2023-04-24

## 2023-04-24 NOTE — TELEPHONE ENCOUNTER
Pt has a script for [facility] scripts is telling pt it needs to be current    Please advise    Ph. [phone]

## 2023-05-06 DIAGNOSIS — M81.0 AGE-RELATED OSTEOPOROSIS WITHOUT CURRENT PATHOLOGICAL FRACTURE: Primary | ICD-10-CM

## 2023-05-08 RX ORDER — ALENDRONATE SODIUM 70 MG/1
TABLET ORAL
Qty: 12 TABLET | Refills: 3 | Status: SHIPPED | OUTPATIENT
Start: 2023-05-08

## 2023-05-08 NOTE — TELEPHONE ENCOUNTER
Medication:   Requested Prescriptions     Pending Prescriptions Disp Refills    alendronate (FOSAMAX) 70 MG tablet [Pharmacy Med Name: ALENDRONATE NA 70 MG TAB 70 Tablet] 12 tablet 3     Sig: TAKE 1 TABLET BY MOUTH EVERY 7 DAYS TAKE ONCE PER WEEK IN THE MORNING WITH A FULL GLASS OF WATER, ON AN EMPTY STOMACH, AND DO NOT TAKE ANYTHING <MORE>        Last Filled:      Patient Phone Number: [phone] (home)     Last appt: 2/15/2023   Next appt: 10/4/2023    Last OARRS: No flowsheet data found.

## 2023-06-18 DIAGNOSIS — E55.9 VITAMIN D DEFICIENCY: ICD-10-CM

## 2023-06-19 RX ORDER — ERGOCALCIFEROL 1.25 MG/1
CAPSULE ORAL
Qty: 12 CAPSULE | Refills: 3 | Status: SHIPPED | OUTPATIENT
Start: 2023-06-19

## 2023-06-19 NOTE — TELEPHONE ENCOUNTER
Medication:   Requested Prescriptions     Pending Prescriptions Disp Refills    vitamin D (ERGOCALCIFEROL) 1.25 MG (74791 UT) CAPS capsule [Pharmacy Med Name: VIT D2 1.25 MG (50,000 UNIT 38229 Capsule] 12 capsule 3     Sig: TAKE 1 CAPSULE BY MOUTH ONCE A WEEK       Last Filled:      Patient Phone Number: [phone] (home)     Last appt: 2/15/2023   Next appt: 10/4/2023    Last Labs DM:   Lab Results   Component Value Date/Time    VITD25 69.7 10/24/2022 10:17 AM    VITD25 62.7 01/17/2022 11:13 AM

## 2024-01-22 DIAGNOSIS — M81.0 AGE-RELATED OSTEOPOROSIS WITHOUT CURRENT PATHOLOGICAL FRACTURE: ICD-10-CM

## 2024-01-22 DIAGNOSIS — E03.9 ACQUIRED HYPOTHYROIDISM: ICD-10-CM

## 2024-01-22 DIAGNOSIS — E55.9 VITAMIN D DEFICIENCY: ICD-10-CM

## 2024-01-22 LAB
25(OH)D3 SERPL-MCNC: 66.2 NG/ML
CHOLEST SERPL-MCNC: 217 MG/DL (ref 0–199)
HDLC SERPL-MCNC: 97 MG/DL (ref 40–60)
LDLC SERPL CALC-MCNC: 106 MG/DL
TRIGL SERPL-MCNC: 72 MG/DL (ref 0–150)
TSH SERPL DL<=0.005 MIU/L-ACNC: 2.64 UIU/ML (ref 0.27–4.2)
VLDLC SERPL CALC-MCNC: 14 MG/DL

## 2024-01-23 ENCOUNTER — OFFICE VISIT (OUTPATIENT)
Dept: ENDOCRINOLOGY | Age: 79
End: 2024-01-23
Payer: MEDICARE

## 2024-01-23 VITALS
RESPIRATION RATE: 14 BRPM | BODY MASS INDEX: 27.72 KG/M2 | SYSTOLIC BLOOD PRESSURE: 114 MMHG | OXYGEN SATURATION: 98 % | HEART RATE: 77 BPM | WEIGHT: 146.8 LBS | DIASTOLIC BLOOD PRESSURE: 61 MMHG | HEIGHT: 61 IN | TEMPERATURE: 98 F

## 2024-01-23 DIAGNOSIS — E55.9 VITAMIN D DEFICIENCY: ICD-10-CM

## 2024-01-23 DIAGNOSIS — M81.0 AGE-RELATED OSTEOPOROSIS WITHOUT CURRENT PATHOLOGICAL FRACTURE: ICD-10-CM

## 2024-01-23 DIAGNOSIS — E78.00 PURE HYPERCHOLESTEROLEMIA: ICD-10-CM

## 2024-01-23 DIAGNOSIS — E03.9 ACQUIRED HYPOTHYROIDISM: ICD-10-CM

## 2024-01-23 PROCEDURE — 1123F ACP DISCUSS/DSCN MKR DOCD: CPT | Performed by: INTERNAL MEDICINE

## 2024-01-23 PROCEDURE — 99214 OFFICE O/P EST MOD 30 MIN: CPT | Performed by: INTERNAL MEDICINE

## 2024-01-23 PROCEDURE — 3078F DIAST BP <80 MM HG: CPT | Performed by: INTERNAL MEDICINE

## 2024-01-23 PROCEDURE — 3074F SYST BP LT 130 MM HG: CPT | Performed by: INTERNAL MEDICINE

## 2024-01-23 RX ORDER — PITAVASTATIN 2 MG/1
TABLET, FILM COATED ORAL
Qty: 90 TABLET | Refills: 3 | Status: SHIPPED | OUTPATIENT
Start: 2024-01-23

## 2024-01-23 RX ORDER — ERGOCALCIFEROL 1.25 MG/1
CAPSULE ORAL
Qty: 12 CAPSULE | Refills: 3 | Status: SHIPPED | OUTPATIENT
Start: 2024-01-23

## 2024-01-23 RX ORDER — LEVOTHYROXINE SODIUM 0.05 MG/1
TABLET ORAL
Qty: 90 TABLET | Refills: 3 | Status: SHIPPED | OUTPATIENT
Start: 2024-01-23

## 2024-01-23 RX ORDER — ALENDRONATE SODIUM 70 MG/1
TABLET ORAL
Qty: 12 TABLET | Refills: 3 | Status: SHIPPED | OUTPATIENT
Start: 2024-01-23

## 2024-01-23 NOTE — PROGRESS NOTES
This 75 yrs old female is here for management of hyperlipidemia, vit D def, hypothyroidism, impaired fasting    She was seeing Dr. Christianson    Interim:    Taking livalo  Every day  stable    Hyperlipidemia :   She has h/o hypercholesterolemia for years    She took lipitor 40mg then started experiencing myalgias in upper legs, especially at night. Then took Crestor 10mg which reproduced the symptoms.    She was started on vit D  twice a week and was given crestor 20 mg daily with it.     She tolerated it initially but then started having muscle pains and stopped it.     Could not tolerate Pravastatin in 02/18     She is currently on Livalo 2 mg every other day.    She is able to tolerate Livalo.   No significant muscle aches     Mild, controlled, no worsening factors    ----> 154--->90---> 117---> 115---> 158---> 114---> 127--> 138---> 113--> 106  --> 114--> 95---> 85---> 102---> 91--> 95---> 97  TGD 61---> 88---> 87----> 124---> 100---> 102---> 116  TC  337--->  227---> 268---> 236--> 238 ---> 256---> 245---> 217    She does exercise    1/21       7/21     1/22      Hypothyroidism : She has hypothyroidism . On levothyroxine 50 mcg daily.   For years, stable    On levothyroxine 50 mg daily.     TSH 3.15 ---> 3.46---> 4.46---> 2.65---> 2.45---> 2.75---> 2.64    Vitamin D def : She is on vitamin D 50,000 IU weekly.     She was on 50,000 IU twice a week. Dose was adjusted as her levels were high normal.     Vitamin D 85.1---> 62.8---> 73.5---> 68---> 78.1---> 62      Impaired fasting glucose :  Glucose 104---> 100---> 103---> 101---> 101---> 92---> 89  She has h/o impaired fasting glucose  No significant FH of DM    Weight has been stable.   She is on diet management.     She has osteopenia, follows with Dr. Marks    Records from Dr. Lauren and Dr. Christianson,  labs reviewed    She has osteopenia, followed by Dr. Marks, they would like us to

## 2024-02-22 ENCOUNTER — HOSPITAL ENCOUNTER (OUTPATIENT)
Dept: WOMENS IMAGING | Age: 79
Discharge: HOME | End: 2024-02-22
Payer: MEDICARE

## 2024-02-22 VITALS — WEIGHT: 146 LBS | HEIGHT: 61 IN | BODY MASS INDEX: 27.56 KG/M2

## 2024-02-22 DIAGNOSIS — Z12.31 SCREENING MAMMOGRAM FOR BREAST CANCER: ICD-10-CM

## 2024-02-22 PROCEDURE — 77063 BREAST TOMOSYNTHESIS BI: CPT

## 2024-11-17 DIAGNOSIS — E03.9 ACQUIRED HYPOTHYROIDISM: ICD-10-CM

## 2024-11-18 RX ORDER — LEVOTHYROXINE SODIUM 50 UG/1
TABLET ORAL
Qty: 90 TABLET | Refills: 0 | Status: SHIPPED | OUTPATIENT
Start: 2024-11-18

## 2025-01-08 ENCOUNTER — TELEPHONE (OUTPATIENT)
Dept: ENDOCRINOLOGY | Age: 80
End: 2025-01-08

## 2025-01-08 DIAGNOSIS — E55.9 VITAMIN D DEFICIENCY: ICD-10-CM

## 2025-01-08 DIAGNOSIS — E03.9 ACQUIRED HYPOTHYROIDISM: ICD-10-CM

## 2025-01-08 RX ORDER — ERGOCALCIFEROL 1.25 MG/1
CAPSULE, LIQUID FILLED ORAL
Qty: 12 CAPSULE | Refills: 0 | Status: SHIPPED | OUTPATIENT
Start: 2025-01-08

## 2025-01-08 RX ORDER — LEVOTHYROXINE SODIUM 50 UG/1
TABLET ORAL
Qty: 90 TABLET | Refills: 0 | Status: SHIPPED | OUTPATIENT
Start: 2025-01-08

## 2025-01-08 NOTE — TELEPHONE ENCOUNTER
Pt called in and needs a refill for the Levothyroxine and her Vitamin D.  Can you please send it over to the pharmacy.      [facility] - [place], [place] - [address] [place] [address] - P [phone] - F [phone]  [address] [place] [id number]  Phone: [phone]  Fax: [phone]

## 2025-03-18 ENCOUNTER — TELEPHONE (OUTPATIENT)
Dept: ENDOCRINOLOGY | Age: 80
End: 2025-03-18

## 2025-03-18 DIAGNOSIS — E03.9 ACQUIRED HYPOTHYROIDISM: Primary | ICD-10-CM

## 2025-03-18 NOTE — TELEPHONE ENCOUNTER
Pt calling and has appt on 3/31/25    Her lab orders have     She would like new orders placed and called to let her know     CB# [phone]

## 2025-03-25 DIAGNOSIS — E78.00 PURE HYPERCHOLESTEROLEMIA: ICD-10-CM

## 2025-03-25 RX ORDER — PITAVASTATIN CALCIUM 2.09 MG/1
TABLET, FILM COATED ORAL
Qty: 90 TABLET | Refills: 3 | Status: SHIPPED | OUTPATIENT
Start: 2025-03-25

## 2025-03-25 NOTE — TELEPHONE ENCOUNTER
Medication:   Requested Prescriptions     Pending Prescriptions Disp Refills    pitavastatin (LIVALO) 2 MG TABS tablet 90 tablet 3     Sig: TAKE 1 TABLET DAILY       Last Filled:      Patient Phone Number: [phone] (home)     Last appt: 1/23/2024   Next appt: 3/31/2025    Last Labs DM:   Lab Results   Component Value Date/Time    LABA1C 5.7 10/24/2022 10:17 AM     Last Lipid:   Lab Results   Component Value Date/Time    CHOL 217 01/22/2024 08:54 AM    TRIG 72 01/22/2024 08:54 AM    HDL 97 01/22/2024 08:54 AM     Last PSA: No results found for: \"PSA\"  Last Thyroid:   Lab Results   Component Value Date/Time    TSH 2.64 01/22/2024 08:54 AM    TSH 2.650 01/16/2020 09:44 AM    T4FREE 1.1 10/24/2022 10:17 AM

## 2025-03-28 DIAGNOSIS — E03.9 ACQUIRED HYPOTHYROIDISM: ICD-10-CM

## 2025-03-29 LAB
CHOLEST SERPL-MCNC: 238 MG/DL (ref 0–199)
HDLC SERPL-MCNC: 104 MG/DL (ref 40–60)
LDLC SERPL CALC-MCNC: 118 MG/DL
TRIGL SERPL-MCNC: 79 MG/DL (ref 0–150)
TSH SERPL DL<=0.005 MIU/L-ACNC: 3.38 UIU/ML (ref 0.27–4.2)
VLDLC SERPL CALC-MCNC: 16 MG/DL

## 2025-03-31 ENCOUNTER — OFFICE VISIT (OUTPATIENT)
Dept: ENDOCRINOLOGY | Age: 80
End: 2025-03-31
Payer: MEDICARE

## 2025-03-31 VITALS
SYSTOLIC BLOOD PRESSURE: 127 MMHG | HEIGHT: 61 IN | RESPIRATION RATE: 14 BRPM | WEIGHT: 149 LBS | OXYGEN SATURATION: 97 % | HEART RATE: 68 BPM | DIASTOLIC BLOOD PRESSURE: 63 MMHG | BODY MASS INDEX: 28.13 KG/M2

## 2025-03-31 DIAGNOSIS — E03.9 ACQUIRED HYPOTHYROIDISM: ICD-10-CM

## 2025-03-31 DIAGNOSIS — E55.9 VITAMIN D DEFICIENCY: ICD-10-CM

## 2025-03-31 DIAGNOSIS — M81.0 AGE-RELATED OSTEOPOROSIS WITHOUT CURRENT PATHOLOGICAL FRACTURE: Primary | ICD-10-CM

## 2025-03-31 PROCEDURE — 1159F MED LIST DOCD IN RCRD: CPT | Performed by: INTERNAL MEDICINE

## 2025-03-31 PROCEDURE — 3074F SYST BP LT 130 MM HG: CPT | Performed by: INTERNAL MEDICINE

## 2025-03-31 PROCEDURE — 99214 OFFICE O/P EST MOD 30 MIN: CPT | Performed by: INTERNAL MEDICINE

## 2025-03-31 PROCEDURE — 3078F DIAST BP <80 MM HG: CPT | Performed by: INTERNAL MEDICINE

## 2025-03-31 PROCEDURE — G2211 COMPLEX E/M VISIT ADD ON: HCPCS | Performed by: INTERNAL MEDICINE

## 2025-03-31 PROCEDURE — 1123F ACP DISCUSS/DSCN MKR DOCD: CPT | Performed by: INTERNAL MEDICINE

## 2025-03-31 RX ORDER — ERGOCALCIFEROL 1.25 MG/1
CAPSULE, LIQUID FILLED ORAL
Qty: 12 CAPSULE | Refills: 0 | Status: SHIPPED | OUTPATIENT
Start: 2025-03-31

## 2025-03-31 RX ORDER — LEVOTHYROXINE SODIUM 50 UG/1
TABLET ORAL
Qty: 90 TABLET | Refills: 0 | Status: SHIPPED | OUTPATIENT
Start: 2025-03-31

## 2025-03-31 NOTE — PROGRESS NOTES
This 75 yrs old female is here for management of hyperlipidemia, vit D def, hypothyroidism, impaired fasting    She was seeing Dr. Christianson    Interim:    Taking livalo  Every day  stable  Not taking fosamax for 6 months  Worried about side effect    Hyperlipidemia :   She has h/o hypercholesterolemia for years    She took lipitor 40mg then started experiencing myalgias in upper legs, especially at night. Then took Crestor 10mg which reproduced the symptoms.    She was started on vit D  twice a week and was given crestor 20 mg daily with it.     She tolerated it initially but then started having muscle pains and stopped it.     Could not tolerate Pravastatin in 02/18     She is currently on Livalo 2 mg every other day.    She is able to tolerate Livalo.   No significant muscle aches     Mild, controlled, no worsening factors    ----> 154--->90---> 117---> 115---> 158---> 114---> 127--> 138---> 113--> 106  --> 114--> 95---> 85---> 102---> 91--> 95---> 97  TGD 61---> 88---> 87----> 124---> 100---> 102---> 116  TC  337--->  227---> 268---> 236--> 238 ---> 256---> 245---> 217    She does exercise    1/21       7/21     1/22      Hypothyroidism : She has hypothyroidism . On levothyroxine 50 mcg daily.   For years, stable    On levothyroxine 50 mg daily.     TSH 3.15 ---> 3.46---> 4.46---> 2.65---> 2.45---> 2.75---> 2.64    Vitamin D def : She is on vitamin D 50,000 IU weekly.     She was on 50,000 IU twice a week. Dose was adjusted as her levels were high normal.     Vitamin D 85.1---> 62.8---> 73.5---> 68---> 78.1---> 62      Impaired fasting glucose :  Glucose 104---> 100---> 103---> 101---> 101---> 92---> 89  She has h/o impaired fasting glucose  No significant FH of DM    Weight has been stable.   She is on diet management.     She has osteopenia, follows with Dr. Marks    Records from Dr. Lauren and Dr. Christianson,  labs reviewed    She has osteopenia,

## 2025-07-18 DIAGNOSIS — E55.9 VITAMIN D DEFICIENCY: ICD-10-CM

## 2025-07-18 RX ORDER — ERGOCALCIFEROL 1.25 MG/1
50000 CAPSULE, LIQUID FILLED ORAL WEEKLY
Qty: 12 CAPSULE | Refills: 0 | Status: SHIPPED | OUTPATIENT
Start: 2025-07-18

## 2025-07-18 NOTE — TELEPHONE ENCOUNTER
Medication:   Requested Prescriptions     Pending Prescriptions Disp Refills    vitamin D (ERGOCALCIFEROL) 1.25 MG (79109 UT) CAPS capsule [Pharmacy Med Name: VIT D2 1.25 MG (50,000 UNIT 79542 Capsule] 12 capsule 0     Sig: TAKE 1 CAPSULE BY MOUTH ONCE A WEEK       Last Filled:      Patient Phone Number: [phone] (home)     Last appt: 3/31/2025   Next appt: 3/31/26    Last Labs DM:   Lab Results   Component Value Date/Time    VITD25 66.2 01/22/2024 08:54 AM    VITD25 69.7 10/24/2022 10:17 AM